# Patient Record
Sex: FEMALE | Race: WHITE | Employment: OTHER | URBAN - METROPOLITAN AREA
[De-identification: names, ages, dates, MRNs, and addresses within clinical notes are randomized per-mention and may not be internally consistent; named-entity substitution may affect disease eponyms.]

---

## 2017-01-23 RX ORDER — GABAPENTIN 100 MG/1
CAPSULE ORAL
Qty: 90 CAP | Refills: 0 | Status: SHIPPED | OUTPATIENT
Start: 2017-01-23 | End: 2017-04-12 | Stop reason: SDUPTHER

## 2017-02-24 ENCOUNTER — OFFICE VISIT (OUTPATIENT)
Dept: ORTHOPEDIC SURGERY | Age: 82
End: 2017-02-24

## 2017-02-24 VITALS
DIASTOLIC BLOOD PRESSURE: 68 MMHG | WEIGHT: 127 LBS | HEIGHT: 59 IN | SYSTOLIC BLOOD PRESSURE: 161 MMHG | HEART RATE: 82 BPM | BODY MASS INDEX: 25.6 KG/M2

## 2017-02-24 DIAGNOSIS — M48.061 SPINAL STENOSIS, LUMBAR: Primary | ICD-10-CM

## 2017-02-24 DIAGNOSIS — M47.817 SPONDYLOSIS WITHOUT MYELOPATHY OR RADICULOPATHY, LUMBOSACRAL REGION: ICD-10-CM

## 2017-02-24 DIAGNOSIS — M51.26 OTHER INTERVERTEBRAL DISC DISPLACEMENT, LUMBAR REGION: ICD-10-CM

## 2017-02-24 DIAGNOSIS — M51.37 OTHER INTERVERTEBRAL DISC DEGENERATION, LUMBOSACRAL REGION: ICD-10-CM

## 2017-02-24 DIAGNOSIS — M48.061 SPINAL STENOSIS, LUMBAR: ICD-10-CM

## 2017-02-24 RX ORDER — DULOXETIN HYDROCHLORIDE 20 MG/1
20 CAPSULE, DELAYED RELEASE ORAL DAILY
Qty: 30 CAP | Refills: 1 | Status: SHIPPED | OUTPATIENT
Start: 2017-02-24 | End: 2017-02-24 | Stop reason: SDUPTHER

## 2017-02-24 RX ORDER — DULOXETIN HYDROCHLORIDE 20 MG/1
CAPSULE, DELAYED RELEASE ORAL
Qty: 90 CAP | Refills: 1 | Status: SHIPPED | OUTPATIENT
Start: 2017-02-24

## 2017-02-24 NOTE — PROGRESS NOTES
[facility]  [address]  [address], [address]   Phone: [phone]  Fax: [phone]        PROGRESS NOTE      HISTORY OF PRESENT ILLNESS:  The patient is a 80 y.o. female and was seen today for follow up of improved low back pain, symptoms consistent for stenosis. She also reports cramping in her right calf at night. Patient has additional complaints of bilateral knee pain (L>R) and has previously seen by Dr. Lawrence Hi who administered knee injections which she did not tolerate particularly well. She tolerates Neurontin 100 mg TID with benefit. Patient states her symptoms have improved with the medication although it does cause drowsiness. Pt has a hx of glaucoma and is not a candidate for Topamax. She reports she stopped taking the blood thinners after GI bleeding. She had a blood transfusion, and it was determined the bleeding was due to the Medrol Dosepak. She is being treated by Dr. Vanessa Mendieta and diverticulitis was seen on the scope. Pt has a pacemaker and is not a candidate for an MRI. Patient denies fever, weight loss, or skin changes. Patient denies change in bowel or bladder habits. CT scan from 2012 showed moderate neuroforaminal stenosis. Preliminary reading of lumbar spine plain films revealed scoliosis apex convex to the right, pan lumbar degenerative disc disease, no acute pathology identified. At her last clinical appointment, her low back pain was well-controlled with Neurontin. She was provided with refills of Neurontin 100 mg TID. I referred her to Dr. Lawrence Hi for further evaluation of her bilateral knee pain (L>R). The patient returns today with pain location and distribution remain unchanged. She rates pain 0/10, an improvement from his last visit (5/10). Note from Dr. Lawrence Hi dated 12/5/16 indicating patient was seen for bilateral knee pain. Of note, he felt the majority of her pain was in the hips and buttocks.  She reported intolerance to TRAMADOL and d/c'd its use. Patient continues Neurontin 100 mg TID but is requesting to d/c as taking the medication TID is inconvenient for her.  reviewed. Past Medical History:   Diagnosis Date    Hypertension         Social History     Social History    Marital status:      Spouse name: N/A    Number of children: N/A    Years of education: N/A     Occupational History    Not on file. Social History Main Topics    Smoking status: Current Every Day Smoker     Packs/day: 0.50    Smokeless tobacco: Never Used    Alcohol use No    Drug use: No    Sexual activity: Not on file     Other Topics Concern    Not on file     Social History Narrative       Current Outpatient Prescriptions   Medication Sig Dispense Refill    DULoxetine (CYMBALTA) 20 mg capsule Take 1 Cap by mouth daily. 30 Cap 1    gabapentin (NEURONTIN) 100 mg capsule TAKE 1 CAPSULE BY MOUTH THREE TIMES DAILY 90 Cap 0    terbinafine HCl (LAMISIL) 1 % topical cream   6    latanoprost (XALATAN) 0.005 % ophthalmic solution Administer 1 Drop to both eyes nightly.  pantoprazole (PROTONIX) 40 mg tablet Take 40 mg by mouth daily.  diltiazem hcl (CARDIZEM) 120 mg tablet Take 120 mg by mouth four (4) times daily.  PNV with Ca No.65-Iron Poly-FA 60 mg iron-1 mg cap Take  by mouth.  levothyroxine (SYNTHROID) 50 mcg tablet Take  by mouth Daily (before breakfast).  cholecalciferol (VITAMIN D3) 1,000 unit tablet Take  by mouth daily.  traMADol (ULTRAM) 50 mg tablet Take 1 Tab by mouth every six (6) hours as needed for Pain. Max Daily Amount: 200 mg. 50 Tab 2    losartan-hydrochlorothiazide (HYZAAR) 100-12.5 mg per tablet TK 1 T PO QD  1    gabapentin (NEURONTIN) 100 mg capsule Take 1 Cap by mouth three (3) times daily.  90 Cap 1       Allergies   Allergen Reactions    Codeine Itching    Penicillins Unknown (comments)          PHYSICAL EXAMINATION    Visit Vitals    /68    Pulse 82  Ht 4' 10.5\" (1.486 m)    Wt 127 lb (57.6 kg)    BMI 26.09 kg/m2       CONSTITUTIONAL: NAD, A&O x 3  SENSATION: Intact to light touch throughout  RANGE OF MOTION: The patient has full passive range of motion in all four extremities. MOTOR:  Straight Leg Raise: Negative, bilateral               Hip Flex Knee Ext Knee Flex Ankle DF GTE Ankle PF Tone   Right +4/5 +4/5 +4/5 +4/5 +4/5 +4/5 +4/5   Left +4/5 +4/5 +4/5 +4/5 +4/5 +4/5 +4/5       ASSESSMENT   Marlin Perla was seen today for follow-up. Diagnoses and all orders for this visit:    Spinal stenosis, lumbar  -     DULoxetine (CYMBALTA) 20 mg capsule; Take 1 Cap by mouth daily. Other intervertebral disc displacement, lumbar region  -     DULoxetine (CYMBALTA) 20 mg capsule; Take 1 Cap by mouth daily. Spondylosis without myelopathy or radiculopathy, lumbosacral region  -     DULoxetine (CYMBALTA) 20 mg capsule; Take 1 Cap by mouth daily. Other intervertebral disc degeneration, lumbosacral region  -     DULoxetine (CYMBALTA) 20 mg capsule; Take 1 Cap by mouth daily. IMPRESSION AND PLAN:  She would like to try another neuropathic medication as she finds taking Neurontin TID inconvenient. Patient will d/c Neurontin 100 mg TID. I will try her on Cymbalta 20 mg per day. The risks, benefits, and potential side effects of this medication were discussed. Patient understands and wished to proceed. Patient advised to call the office if intolerant to new medication. I will see the patient back in 1 month's time or earlier if needed. Written by Keegan Velásquez, as dictated by Chepe Riley MD  I examined the patient, reviewed and agree with the note.

## 2017-02-24 NOTE — MR AVS SNAPSHOT
Visit Information Date & Time Provider Department Dept. Phone Encounter #  
 2/24/2017  2:20 PM Ewa Arredondo MD 4 [facility], [address] and [facility] [place] [phone] [id number] Follow-up Instructions Return in about 4 weeks (around 3/24/2017). Upcoming Health Maintenance Date Due DTaP/Tdap/Td series (1 - Tdap) 10/7/1950 ZOSTER VACCINE AGE 60> 10/7/1989 GLAUCOMA SCREENING Q2Y 10/7/1994 OSTEOPOROSIS SCREENING (DEXA) 10/7/1994 Pneumococcal 65+ Low/Medium Risk (1 of 2 - PCV13) 10/7/1994 MEDICARE YEARLY EXAM 10/7/1994 INFLUENZA AGE 9 TO ADULT 8/1/2016 Allergies as of 2/24/2017  Review Complete On: 2/24/2017 By: Ewa Arredondo MD  
  
 Severity Noted Reaction Type Reactions Codeine  07/18/2016    Itching Penicillins  12/12/2012    Unknown (comments) Current Immunizations  Never Reviewed No immunizations on file. Not reviewed this visit You Were Diagnosed With   
  
 Codes Comments Spinal stenosis, lumbar    -  Primary ICD-10-CM: M48.06 
ICD-9-CM: 724.02 Other intervertebral disc displacement, lumbar region     ICD-10-CM: M51.26 
ICD-9-CM: 722.10 Spondylosis without myelopathy or radiculopathy, lumbosacral region     ICD-10-CM: M47.817 ICD-9-CM: 721.3 Other intervertebral disc degeneration, lumbosacral region     ICD-10-CM: M51.37 
ICD-9-CM: 722.52 Vitals BP  
  
  
  
  
  
 161/68 Vitals History BMI and BSA Data Body Mass Index Body Surface Area 26.09 kg/m 2 1.54 m 2 Preferred Pharmacy Pharmacy Name Phone [facility] [address] [place] [address] AT [facility] [phone] Your Updated Medication List  
  
   
This list is accurate as of: 2/24/17  3:42 PM.  Always use your most recent med list.  
  
  
  
  
 cholecalciferol 1,000 unit tablet Commonly known as:  VITAMIN D3 Take  by mouth daily. dilTIAZem 120 mg tablet Commonly known as:  CARDIZEM Take 120 mg by mouth four (4) times daily. DULoxetine 20 mg capsule Commonly known as:  CYMBALTA Take 1 Cap by mouth daily. * gabapentin 100 mg capsule Commonly known as:  NEURONTIN Take 1 Cap by mouth three (3) times daily. * gabapentin 100 mg capsule Commonly known as:  NEURONTIN  
TAKE 1 CAPSULE BY MOUTH THREE TIMES DAILY  
  
 latanoprost 0.005 % ophthalmic solution Commonly known as:  Mesha Sin Administer 1 Drop to both eyes nightly. levothyroxine 50 mcg tablet Commonly known as:  SYNTHROID Take  by mouth Daily (before breakfast). losartan-hydroCHLOROthiazide 100-12.5 mg per tablet Commonly known as:  HYZAAR TK 1 T PO QD  
  
 pantoprazole 40 mg tablet Commonly known as:  PROTONIX Take 40 mg by mouth daily. PNV with Ca No.65-Iron Poly-FA 60 mg iron-1 mg Cap Take  by mouth. terbinafine HCl 1 % topical cream  
Commonly known as:  LAMISIL  
  
 traMADol 50 mg tablet Commonly known as:  ULTRAM  
Take 1 Tab by mouth every six (6) hours as needed for Pain. Max Daily Amount: 200 mg.  
  
 * Notice: This list has 2 medication(s) that are the same as other medications prescribed for you. Read the directions carefully, and ask your doctor or other care provider to review them with you. Prescriptions Sent to Pharmacy Refills DULoxetine (CYMBALTA) 20 mg capsule 1 Sig: Take 1 Cap by mouth daily. Class: Normal  
 Pharmacy: [facility] [address] [place] [address] [address]  #: [phone] Route: Oral  
  
Follow-up Instructions Return in about 4 weeks (around 3/24/2017). Introducing [facility]! Chuy Miller introduces [employer] patient portal. Now you can access parts of your medical record, email your doctor's office, and request medication refills online.    
 
1. In your internet browser, go to [email or web address] 2. Click on the First Time User? Click Here link in the Sign In box. You will see the New Member Sign Up page. 3. Enter your [employer] Access Code exactly as it appears below. You will not need to use this code after youve completed the sign-up process. If you do not sign up before the expiration date, you must request a new code. · [employer] Access Code: [email or web address] Expires: 5/25/2017  2:11 PM 
 
4. Enter the last four digits of your Social Security Number (xxxx) and Date of Birth (mm/dd/yyyy) as indicated and click Submit. You will be taken to the next sign-up page. 5. Create a [employer] ID. This will be your [employer] login ID and cannot be changed, so think of one that is secure and easy to remember. 6. Create a [employer] password. You can change your password at any time. 7. Enter your Password Reset Question and Answer. This can be used at a later time if you forget your password. 8. Enter your e-mail address. You will receive e-mail notification when new information is available in [address] 9. Click Sign Up. You can now view and download portions of your medical record. 10. Click the Download Summary menu link to download a portable copy of your medical information. If you have questions, please visit the Frequently Asked Questions section of the [employer] website. Remember, [employer] is NOT to be used for urgent needs. For medical emergencies, dial 911. Now available from your iPhone and Android! Please provide this summary of care documentation to your next provider. Your primary care clinician is listed as Bouchra Moya. If you have any questions after today's visit, please call [phone].

## 2017-03-01 ENCOUNTER — TELEPHONE (OUTPATIENT)
Dept: ORTHOPEDIC SURGERY | Age: 82
End: 2017-03-01

## 2017-03-01 DIAGNOSIS — M48.061 SPINAL STENOSIS, LUMBAR: ICD-10-CM

## 2017-03-01 DIAGNOSIS — M51.37 OTHER INTERVERTEBRAL DISC DEGENERATION, LUMBOSACRAL REGION: Primary | ICD-10-CM

## 2017-03-01 NOTE — TELEPHONE ENCOUNTER
Called and left voice mail informing patient per the provider to stop taking the Duloxetine and see if the symptoms subsides and she can go back on the Neurontin but I will need to give her the instructions to ramp back up on it.

## 2017-03-01 NOTE — TELEPHONE ENCOUNTER
Patient seen 02/24/17 given new medication Duloxetine  20 mg now having severe leg since starting this med also and severe nausea and dry heaves Need to come off this medication. Can she go back the Neurontin?   Please call patient back at today at [phone]

## 2017-03-01 NOTE — TELEPHONE ENCOUNTER
Please advise. Patient is not a candidate for Topamax due to history of glaucoma, Patient discontinued Neurontin due to it being inconvenient. Please advise.

## 2017-03-02 RX ORDER — GABAPENTIN 100 MG/1
100 CAPSULE ORAL
Qty: 90 CAP | Refills: 0 | Status: SHIPPED | OUTPATIENT
Start: 2017-03-02 | End: 2017-05-24 | Stop reason: SDUPTHER

## 2017-03-02 NOTE — TELEPHONE ENCOUNTER
Called and informed patient per the provider she can start back on the Neurontin 100 mg TID but she would have to take it as prescribed. Patient states she has a few pills left and she will need a refill. I gave the patient instructions to ramp up on the medication by taking 1 pill at night for 3 nights, next take 1 pill twice a day for 3 days and thereafter take 1 pill three times a day. Patient verbalized understanding. Patient states she had seen Dr. Nadine Felder before at least 4 years ago and whatever he did at that time worked and she just wants to have that done again. She states she does not know if it was an injection or medication, but whatever it was she wants it. I checked in our old system and the patient received Medrol Dose, Celebrex and flexeril and was sent for physical therapy and there were no injections done. Called and informed patient of the above findings and she states she would like to make an earlier appointment then what she had. I informed patient that Dr. Nadine Felder is out of the office next week and she states she would like the first available. An appointment has been made for 3/22/17 @8:40 am [facility] [place] [facility]. I did explain to patient that we have this thing called and tickler that we could put her on if she would like to be notified of any earlier cancellations. Patient states she would like to be put on it. I have spoken to [facility] [place] and she will put the patient.

## 2017-04-12 ENCOUNTER — OFFICE VISIT (OUTPATIENT)
Dept: ORTHOPEDIC SURGERY | Age: 82
End: 2017-04-12

## 2017-04-12 VITALS
DIASTOLIC BLOOD PRESSURE: 53 MMHG | WEIGHT: 125.8 LBS | RESPIRATION RATE: 18 BRPM | SYSTOLIC BLOOD PRESSURE: 170 MMHG | BODY MASS INDEX: 25.36 KG/M2 | HEART RATE: 74 BPM | HEIGHT: 59 IN

## 2017-04-12 DIAGNOSIS — M48.061 SPINAL STENOSIS, LUMBAR: Primary | ICD-10-CM

## 2017-04-12 DIAGNOSIS — M51.26 OTHER INTERVERTEBRAL DISC DISPLACEMENT, LUMBAR REGION: ICD-10-CM

## 2017-04-12 DIAGNOSIS — M47.817 SPONDYLOSIS WITHOUT MYELOPATHY OR RADICULOPATHY, LUMBOSACRAL REGION: ICD-10-CM

## 2017-04-12 DIAGNOSIS — M51.37 OTHER INTERVERTEBRAL DISC DEGENERATION, LUMBOSACRAL REGION: ICD-10-CM

## 2017-04-12 RX ORDER — GABAPENTIN 100 MG/1
100 CAPSULE ORAL 3 TIMES DAILY
Qty: 270 CAP | Refills: 0 | Status: SHIPPED | OUTPATIENT
Start: 2017-04-12

## 2017-04-12 RX ORDER — LANOLIN ALCOHOL/MO/W.PET/CERES
CREAM (GRAM) TOPICAL
Refills: 1 | COMMUNITY
Start: 2017-02-13

## 2017-04-12 NOTE — PROGRESS NOTES
[facility]  [address]  [address], [address]   Phone: [phone]  Fax: [phone]        PROGRESS NOTE      HISTORY OF PRESENT ILLNESS:  The patient is a 80 y.o. female and was seen today for follow up of improved low back pain, symptoms consistent for stenosis. She also reports cramping in her right calf at night. Her chief complaint is of bilateral knee pain (L>R) and has previously seen by Dr. Chava Aly who administered knee injections which she did not tolerate particularly well. Note from Dr. Chava Aly dated 12/5/16 indicating patient was seen for bilateral knee pain. Of note, he felt the majority of her pain was in the hips and buttocks. Pt has a hx of glaucoma and is not a candidate for Topamax. She reported intolerance to TRAMADOL and d/c'd its use. She reports she stopped taking the blood thinners after GI bleeding. Pt had a blood transfusion, and it was determined the bleeding was due to the Medrol Dosepak. She is being treated by Dr. Ciarra Segura and diverticulitis was seen on the scope. Pt has a pacemaker and is not a candidate for an MRI. Patient denies fever, weight loss, or skin changes. Patient denies change in bowel or bladder habits. CT scan from 2012 showed moderate neuroforaminal stenosis. Preliminary reading of lumbar spine plain films revealed scoliosis apex convex to the right, pan lumbar degenerative disc disease, no acute pathology identified. At her last clinical appointment, She wished to try another neuropathic medication as she found taking Neurontin TID inconvenient. Patient d/c Neurontin 100 mg TID. I tried her on Cymbalta 20 mg per day. The patient returns today with pain location and distribution remain unchanged. She states her low back pain is tolerable and her chief complaint is of bilateral knee pain. Pt rates pain 5/10, elevated since her last visit (0/10). She notes she has good days and bad days.  She reports intolerance to CYMBALTA 20 mg secondary to nausea and dry heaving. Pt subsequently restarted Neurontin 100 mg TID with benefit even though it is inconvenient for her.  reviewed. Past Medical History:   Diagnosis Date    Hypertension         Social History     Social History    Marital status:      Spouse name: N/A    Number of children: N/A    Years of education: N/A     Occupational History    Not on file. Social History Main Topics    Smoking status: Current Every Day Smoker     Packs/day: 0.50    Smokeless tobacco: Never Used    Alcohol use No    Drug use: No    Sexual activity: Not on file     Other Topics Concern    Not on file     Social History Narrative       Current Outpatient Prescriptions   Medication Sig Dispense Refill    ferrous sulfate 325 mg (65 mg iron) tablet TK 1 T PO TID  1    gabapentin (NEURONTIN) 100 mg capsule Take 1 Cap by mouth three (3) times daily. 270 Cap 0    DULoxetine (CYMBALTA) 20 mg capsule TAKE 1 CAPSULE BY MOUTH DAILY 90 Cap 1    terbinafine HCl (LAMISIL) 1 % topical cream   6    latanoprost (XALATAN) 0.005 % ophthalmic solution Administer 1 Drop to both eyes nightly.  pantoprazole (PROTONIX) 40 mg tablet Take 40 mg by mouth daily.  diltiazem hcl (CARDIZEM) 120 mg tablet Take 120 mg by mouth four (4) times daily.  PNV with Ca No.65-Iron Poly-FA 60 mg iron-1 mg cap Take  by mouth.  levothyroxine (SYNTHROID) 50 mcg tablet Take  by mouth Daily (before breakfast).  cholecalciferol (VITAMIN D3) 1,000 unit tablet Take  by mouth daily.  gabapentin (NEURONTIN) 100 mg capsule Take 1 Cap by mouth three (3) times daily (with meals). Indications: NEUROPATHIC PAIN 90 Cap 0    traMADol (ULTRAM) 50 mg tablet Take 1 Tab by mouth every six (6) hours as needed for Pain.  Max Daily Amount: 200 mg. 50 Tab 2    losartan-hydrochlorothiazide (HYZAAR) 100-12.5 mg per tablet TK 1 T PO QD  1       Allergies   Allergen Reactions    Codeine Itching    Penicillins Unknown (comments)          PHYSICAL EXAMINATION    Visit Vitals    /53 (BP 1 Location: Left arm, BP Patient Position: Sitting)    Pulse 74    Resp 18    Ht 4' 10.5\" (1.486 m)    Wt 125 lb 12.8 oz (57.1 kg)    BMI 25.84 kg/m2       CONSTITUTIONAL: NAD, A&O x 3  SENSATION: Intact to light touch throughout  RANGE OF MOTION: The patient has full passive range of motion in all four extremities. MOTOR:  Straight Leg Raise: Negative, bilateral               Hip Flex Knee Ext Knee Flex Ankle DF GTE Ankle PF Tone   Right +4/5 +4/5 +4/5 +4/5 +4/5 +4/5 +4/5   Left +4/5 +4/5 +4/5 +4/5 +4/5 +4/5 +4/5       ASSESSMENT   Danni Silva was seen today for back pain, leg pain and knee pain. Diagnoses and all orders for this visit:    Spinal stenosis, lumbar    Other intervertebral disc displacement, lumbar region    Spondylosis without myelopathy or radiculopathy, lumbosacral region    Other intervertebral disc degeneration, lumbosacral region    Other orders  -     gabapentin (NEURONTIN) 100 mg capsule; Take 1 Cap by mouth three (3) times daily. IMPRESSION AND PLAN:  Consideration was given to increased her Neurontin dose or switching her to Lyrica or Gabitril. She declines at this time and wishes to continue her current regimen. She was given refills of her Neurontin 100 mg TID. I will see the patient back in 3 month's time or earlier if needed. Written by Gail Mcclain, as dictated by Kandy Ramirez MD  I examined the patient, reviewed and agree with the note.

## 2017-04-12 NOTE — MR AVS SNAPSHOT
Visit Information Date & Time Provider Department Dept. Phone Encounter #  
 4/12/2017  9:40 AM Marissa Myers MD 4 [facility], [address] and [facility] [place] [phone] [id number] Follow-up Instructions Return in about 3 months (around 7/12/2017). Upcoming Health Maintenance Date Due DTaP/Tdap/Td series (1 - Tdap) 10/7/1950 ZOSTER VACCINE AGE 60> 10/7/1989 GLAUCOMA SCREENING Q2Y 10/7/1994 OSTEOPOROSIS SCREENING (DEXA) 10/7/1994 Pneumococcal 65+ Low/Medium Risk (1 of 2 - PCV13) 10/7/1994 MEDICARE YEARLY EXAM 10/7/1994 INFLUENZA AGE 9 TO ADULT 8/1/2016 Allergies as of 4/12/2017  Review Complete On: 4/12/2017 By: Stevenson Mckeon LPN Severity Noted Reaction Type Reactions Codeine  07/18/2016    Itching Penicillins  12/12/2012    Unknown (comments) Current Immunizations  Never Reviewed No immunizations on file. Not reviewed this visit You Were Diagnosed With   
  
 Codes Comments Spinal stenosis, lumbar    -  Primary ICD-10-CM: M48.06 
ICD-9-CM: 724.02 Other intervertebral disc displacement, lumbar region     ICD-10-CM: M51.26 
ICD-9-CM: 722.10 Spondylosis without myelopathy or radiculopathy, lumbosacral region     ICD-10-CM: M47.817 ICD-9-CM: 721.3 Other intervertebral disc degeneration, lumbosacral region     ICD-10-CM: M51.37 
ICD-9-CM: 722.52 Vitals BP Pulse Resp Height(growth percentile) Weight(growth percentile) BMI  
 170/53 (BP 1 Location: Left arm, BP Patient Position: Sitting) 74 18 4' 10.5\" (1.486 m) 125 lb 12.8 oz (57.1 kg) 25.84 kg/m2 Smoking Status Current Every Day Smoker BMI and BSA Data Body Mass Index Body Surface Area  
 25.84 kg/m 2 1.54 m 2 Preferred Pharmacy Pharmacy Name Phone [facility] [address] [place] [address] AT [facility] [phone] Your Updated Medication List  
  
   
This list is accurate as of: 4/12/17 10:10 AM.  Always use your most recent med list.  
  
  
  
  
 cholecalciferol 1,000 unit tablet Commonly known as:  VITAMIN D3 Take  by mouth daily. dilTIAZem 120 mg tablet Commonly known as:  CARDIZEM Take 120 mg by mouth four (4) times daily. DULoxetine 20 mg capsule Commonly known as:  CYMBALTA TAKE 1 CAPSULE BY MOUTH DAILY ferrous sulfate 325 mg (65 mg iron) tablet TK 1 T PO TID * gabapentin 100 mg capsule Commonly known as:  NEURONTIN Take 1 Cap by mouth three (3) times daily (with meals). Indications: NEUROPATHIC PAIN  
  
 * gabapentin 100 mg capsule Commonly known as:  NEURONTIN Take 1 Cap by mouth three (3) times daily. latanoprost 0.005 % ophthalmic solution Commonly known as:  Lexus Pentec[employer]al Administer 1 Drop to both eyes nightly. levothyroxine 50 mcg tablet Commonly known as:  SYNTHROID Take  by mouth Daily (before breakfast). losartan-hydroCHLOROthiazide 100-12.5 mg per tablet Commonly known as:  HYZAAR TK 1 T PO QD  
  
 pantoprazole 40 mg tablet Commonly known as:  PROTONIX Take 40 mg by mouth daily. PNV with Ca No.65-Iron Poly-FA 60 mg iron-1 mg Cap Take  by mouth. terbinafine HCl 1 % topical cream  
Commonly known as:  LAMISIL  
  
 traMADol 50 mg tablet Commonly known as:  ULTRAM  
Take 1 Tab by mouth every six (6) hours as needed for Pain. Max Daily Amount: 200 mg.  
  
 * Notice: This list has 2 medication(s) that are the same as other medications prescribed for you. Read the directions carefully, and ask your doctor or other care provider to review them with you. Prescriptions Sent to Pharmacy Refills  
 gabapentin (NEURONTIN) 100 mg capsule 0 Sig: Take 1 Cap by mouth three (3) times daily.   
 Class: Normal  
 Pharmacy: [facility] [address] [facility], [address] [place] [address] Angi  #: [phone] Route: Oral  
  
Follow-up Instructions Return in about 3 months (around 7/12/2017). Introducing [facility] & HEALTH SERVICES! [employer] introduces [employer] patient portal. Now you can access parts of your medical record, email your doctor's office, and request medication refills online. 1. In your internet browser, go to [email or web address] 2. Click on the First Time User? Click Here link in the Sign In box. You will see the New Member Sign Up page. 3. Enter your [employer] Access Code exactly as it appears below. You will not need to use this code after youve completed the sign-up process. If you do not sign up before the expiration date, you must request a new code. · [employer] Access Code: [email or web address] Expires: 5/25/2017  3:11 PM 
 
4. Enter the last four digits of your Social Security Number (xxxx) and Date of Birth (mm/dd/yyyy) as indicated and click Submit. You will be taken to the next sign-up page. 5. Create a [employer] ID. This will be your [employer] login ID and cannot be changed, so think of one that is secure and easy to remember. 6. Create a [employer] password. You can change your password at any time. 7. Enter your Password Reset Question and Answer. This can be used at a later time if you forget your password. 8. Enter your e-mail address. You will receive e-mail notification when new information is available in [address]. 9. Click Sign Up. You can now view and download portions of your medical record. 10. Click the Download Summary menu link to download a portable copy of your medical information. If you have questions, please visit the Frequently Asked Questions section of the [employer] website. Remember, [employer] is NOT to be used for urgent needs. For medical emergencies, dial 911. Now available from your iPhone and Android! Please provide this summary of care documentation to your next provider. Your primary care clinician is listed as Denny Damon. If you have any questions after today's visit, please call [phone].

## 2017-05-24 ENCOUNTER — OFFICE VISIT (OUTPATIENT)
Dept: ORTHOPEDIC SURGERY | Age: 82
End: 2017-05-24

## 2017-05-24 ENCOUNTER — DOCUMENTATION ONLY (OUTPATIENT)
Dept: ORTHOPEDIC SURGERY | Age: 82
End: 2017-05-24

## 2017-05-24 VITALS
DIASTOLIC BLOOD PRESSURE: 68 MMHG | SYSTOLIC BLOOD PRESSURE: 177 MMHG | WEIGHT: 125 LBS | BODY MASS INDEX: 25.2 KG/M2 | HEART RATE: 77 BPM | HEIGHT: 59 IN

## 2017-05-24 DIAGNOSIS — M48.061 SPINAL STENOSIS, LUMBAR: Primary | ICD-10-CM

## 2017-05-24 DIAGNOSIS — M51.26 OTHER INTERVERTEBRAL DISC DISPLACEMENT, LUMBAR REGION: ICD-10-CM

## 2017-05-24 DIAGNOSIS — M51.37 OTHER INTERVERTEBRAL DISC DEGENERATION, LUMBOSACRAL REGION: ICD-10-CM

## 2017-05-24 DIAGNOSIS — M47.817 SPONDYLOSIS WITHOUT MYELOPATHY OR RADICULOPATHY, LUMBOSACRAL REGION: ICD-10-CM

## 2017-05-24 RX ORDER — GABAPENTIN 300 MG/1
300 CAPSULE ORAL 3 TIMES DAILY
Qty: 90 CAP | Refills: 1 | Status: SHIPPED | OUTPATIENT
Start: 2017-05-24 | End: 2017-05-24 | Stop reason: SDUPTHER

## 2017-05-24 RX ORDER — GABAPENTIN 300 MG/1
CAPSULE ORAL
Qty: 270 CAP | Refills: 1 | Status: SHIPPED | OUTPATIENT
Start: 2017-05-24

## 2017-05-24 RX ORDER — KETOROLAC TROMETHAMINE 4 MG/ML
1 SOLUTION/ DROPS OPHTHALMIC 4 TIMES DAILY
COMMUNITY

## 2017-05-24 NOTE — PROGRESS NOTES
[facility]  [address], [place]   Phone: [phone]  Fax: [phone]        PROGRESS NOTE      HISTORY OF PRESENT ILLNESS:  The patient is a 80 y.o. female and was seen today for follow up of improved low back pain, symptoms consistent for stenosis. She also reports cramping in her right calf at night. Her chief complaint is of bilateral knee pain (L>R) and has previously seen by Dr. Dolly Canales who administered knee injections which she did not tolerate particularly well. Note from Dr. Dolly Canales dated 12/5/16 indicating patient was seen for bilateral knee pain. Of note, he felt the majority of her pain was in the hips and buttocks. Pt has a hx of glaucoma and is not a candidate for Topamax. She reported intolerance to TRAMADOL and d/c'd its use. She reports intolerance to CYMBALTA 20 mg secondary to nausea and dry heaving. Pt subsequently restarted Neurontin 100 mg TID with benefit even though it is inconvenient for her. She reports she stopped taking the blood thinners after GI bleeding. Pt had a blood transfusion, and it was determined the bleeding was due to the Medrol Dosepak. She is being treated by Dr. Jan Bernard and diverticulitis was seen on the scope. Pt has a pacemaker and is not a candidate for an MRI. Pt denies fever, weight loss, or skin changes. Pt denies change in bowel or bladder habits. CT scan from 2012 showed moderate neuroforaminal stenosis. Preliminary reading of lumbar spine plain films revealed scoliosis apex convex to the right, pan lumbar degenerative disc disease, no acute pathology identified. At her last clinical appointment, consideration was given to increase her Neurontin dose or switching her to Lyrica or Gabitril. She declined at that time and wishes to continue her current treatment regimen. She was given refills of her Neurontin 100 mg TID.       The patient returns today with progressive low back radiating into the LLE extending in an S1 distribution to the knee. She rates pain 7/10, elevated since her last visit (5/10). Her symptoms are exacerbated with standing and aleviated with sitting. Symptoms consistent for stenosis. Pt continues on Neurontin 100 mg TID which she tolerates well without relief.  reviewed. Past Medical History:   Diagnosis Date    Hypertension         Social History     Social History    Marital status:      Spouse name: N/A    Number of children: N/A    Years of education: N/A     Occupational History    Not on file. Social History Main Topics    Smoking status: Current Every Day Smoker     Packs/day: 0.50    Smokeless tobacco: Never Used    Alcohol use No    Drug use: No    Sexual activity: Not on file     Other Topics Concern    Not on file     Social History Narrative       Current Outpatient Prescriptions   Medication Sig Dispense Refill    ketorolac (ACULAR LS) 0.4 % drop Administer 1 Drop to both eyes four (4) times daily.  gabapentin (NEURONTIN) 300 mg capsule Take 1 Cap by mouth three (3) times daily. 90 Cap 1    gabapentin (NEURONTIN) 100 mg capsule Take 1 Cap by mouth three (3) times daily. 270 Cap 0    latanoprost (XALATAN) 0.005 % ophthalmic solution Administer 1 Drop to both eyes nightly.  pantoprazole (PROTONIX) 40 mg tablet Take 40 mg by mouth daily.  diltiazem hcl (CARDIZEM) 120 mg tablet Take 120 mg by mouth four (4) times daily.  PNV with Ca No.65-Iron Poly-FA 60 mg iron-1 mg cap Take  by mouth.  levothyroxine (SYNTHROID) 50 mcg tablet Take  by mouth Daily (before breakfast).  cholecalciferol (VITAMIN D3) 1,000 unit tablet Take  by mouth daily.  ferrous sulfate 325 mg (65 mg iron) tablet TK 1 T PO TID  1    DULoxetine (CYMBALTA) 20 mg capsule TAKE 1 CAPSULE BY MOUTH DAILY 90 Cap 1    traMADol (ULTRAM) 50 mg tablet Take 1 Tab by mouth every six (6) hours as needed for Pain.  Max Daily Amount: 200 mg. 50 Tab 2    terbinafine HCl (LAMISIL) 1 % topical cream   6    losartan-hydrochlorothiazide (HYZAAR) 100-12.5 mg per tablet TK 1 T PO QD  1       Allergies   Allergen Reactions    Codeine Itching    Penicillins Unknown (comments)          PHYSICAL EXAMINATION    Visit Vitals    /68    Pulse 77    Ht 4' 10.5\" (1.486 m)    Wt 125 lb (56.7 kg)    BMI 25.68 kg/m2       CONSTITUTIONAL: NAD, A&O x 3  SENSATION: Intact to light touch throughout  RANGE OF MOTION: The patient has full passive range of motion in all four extremities. MOTOR:  Straight Leg Raise: Negative, bilateral               Hip Flex Knee Ext Knee Flex Ankle DF GTE Ankle PF Tone   Right +4/5 +4/5 +4/5 +4/5 +4/5 +4/5 +4/5   Left +4/5 +4/5 +4/5 +4/5 +4/5 +4/5 +4/5       ASSESSMENT   Raman Monk was seen today for follow-up. Diagnoses and all orders for this visit:    Spinal stenosis, lumbar  -     CT SPINE LUMB W CONT; Future    Other intervertebral disc displacement, lumbar region  -     CT SPINE LUMB W CONT; Future    Spondylosis without myelopathy or radiculopathy, lumbosacral region  -     CT SPINE LUMB W CONT; Future    Other intervertebral disc degeneration, lumbosacral region  -     CT SPINE LUMB W CONT; Future    Other orders  -     gabapentin (NEURONTIN) 300 mg capsule; Take 1 Cap by mouth three (3) times daily. IMPRESSION AND PLAN:  She describes symptoms consistent for spinal stenosis. Patient wishes to proceed with lumbar blocks. I will set her up for a lumbar spine CT. In the interim, I will increase her Neurontin to 300 mg TID. Patient advised to call the office if intolerant to higher dose. I will see the patient back following CT. Written by Rock Herrera, as dictated by Junior Gordon MD  I examined the patient, reviewed and agree with the note.

## 2017-05-24 NOTE — PROGRESS NOTES
CT  Lumbar with is scheduled at [facility], [id number], on 05/31/17, arrive 1:30PM, test 2:00PM. No Medicare pre-authorization required.

## 2017-05-24 NOTE — MR AVS SNAPSHOT
Visit Information Date & Time Provider Department Dept. Phone Encounter #  
 5/24/2017 10:15 AM Daxa Guido  [address], [address] and [facility] [place]  Follow-up Instructions Return for following CT. Your Appointments 7/7/2017  1:00 PM  
Follow Up with Daxa Guido MD  
[address], [address] and [facility] [place] ([facility]) Appt Note: 3 month ck up lower back; rs fr 7-12 due to dr out of the office; PT RETURNED [address] [facility] 7/12/17 FOR THIS NEW DATE.  
 [address] [facility] [address]  
[address] [facility] Upcoming Health Maintenance Date Due DTaP/Tdap/Td series (1 - Tdap) 10/7/1950 ZOSTER VACCINE AGE 60> 10/7/1989 GLAUCOMA SCREENING Q2Y 10/7/1994 OSTEOPOROSIS SCREENING (DEXA) 10/7/1994 Pneumococcal 65+ Low/Medium Risk (1 of 2 - PCV13) 10/7/1994 MEDICARE YEARLY EXAM 10/7/1994 INFLUENZA AGE 9 TO ADULT 8/1/2017 Allergies as of 5/24/2017  Review Complete On: 5/24/2017 By: Daxa Guido MD  
  
 Severity Noted Reaction Type Reactions Codeine  07/18/2016    Itching Penicillins  12/12/2012    Unknown (comments) Current Immunizations  Never Reviewed No immunizations on file. Not reviewed this visit You Were Diagnosed With   
  
 Codes Comments Spinal stenosis, lumbar    -  Primary ICD-10-CM: M48.06 
ICD-9-CM: 724.02 Other intervertebral disc displacement, lumbar region     ICD-10-CM: M51.26 
ICD-9-CM: 722.10 Spondylosis without myelopathy or radiculopathy, lumbosacral region     ICD-10-CM: M47.817 ICD-9-CM: 721.3 Other intervertebral disc degeneration, lumbosacral region     ICD-10-CM: M51.37 
ICD-9-CM: 722.52 Vitals BP Pulse Height(growth percentile) Weight(growth percentile) BMI Smoking Status  177/68 77 4' 10.5\" (1.486 m) 125 lb (56.7 kg) 25.68 kg/m2 Current Every Day Smoker Vitals History BMI and BSA Data Body Mass Index Body Surface Area  
 25.68 kg/m 2 1.53 m 2 Preferred Pharmacy Pharmacy Name Phone [facility] [address] [facility], [place] [address] [facility] AT [facility] [phone] Your Updated Medication List  
  
   
This list is accurate as of: 5/24/17 12:14 PM.  Always use your most recent med list.  
  
  
  
  
 ACULAR LS 0.4 % Drop Generic drug:  ketorolac Administer 1 Drop to both eyes four (4) times daily. cholecalciferol 1,000 unit tablet Commonly known as:  VITAMIN D3 Take  by mouth daily. dilTIAZem 120 mg tablet Commonly known as:  CARDIZEM Take 120 mg by mouth four (4) times daily. DULoxetine 20 mg capsule Commonly known as:  CYMBALTA TAKE 1 CAPSULE BY MOUTH DAILY ferrous sulfate 325 mg (65 mg iron) tablet TK 1 T PO TID * gabapentin 100 mg capsule Commonly known as:  NEURONTIN Take 1 Cap by mouth three (3) times daily. * gabapentin 300 mg capsule Commonly known as:  NEURONTIN Take 1 Cap by mouth three (3) times daily. latanoprost 0.005 % ophthalmic solution Commonly known as:  Renee Anuj Administer 1 Drop to both eyes nightly. levothyroxine 50 mcg tablet Commonly known as:  SYNTHROID Take  by mouth Daily (before breakfast). losartan-hydroCHLOROthiazide 100-12.5 mg per tablet Commonly known as:  HYZAAR TK 1 T PO QD  
  
 pantoprazole 40 mg tablet Commonly known as:  PROTONIX Take 40 mg by mouth daily. PNV with Ca No.65-Iron Poly-FA 60 mg iron-1 mg Cap Take  by mouth. terbinafine HCl 1 % topical cream  
Commonly known as:  LAMISIL  
  
 traMADol 50 mg tablet Commonly known as:  ULTRAM  
Take 1 Tab by mouth every six (6) hours as needed for Pain. Max Daily Amount: 200 mg.  
  
 * Notice:   This list has 2 medication(s) that are the same as other medications prescribed for you. Read the directions carefully, and ask your doctor or other care provider to review them with you. Prescriptions Sent to Pharmacy Refills  
 gabapentin (NEURONTIN) 300 mg capsule 1 Sig: Take 1 Cap by mouth three (3) times daily. Class: Normal  
 Pharmacy: [facility] [address] [place] [address] [address] Ph #: [phone] Route: Oral  
  
Follow-up Instructions Return for following CT. To-Do List   
 05/31/2017 Imaging:  CT SPINE LUMB W CONT Introducing [facility] & HEALTH SERVICES! [employer] introduces [employer] patient portal. Now you can access parts of your medical record, email your doctor's office, and request medication refills online. 1. In your internet browser, go to [email or web address] 2. Click on the First Time User? Click Here link in the Sign In box. You will see the New Member Sign Up page. 3. Enter your [employer] Access Code exactly as it appears below. You will not need to use this code after youve completed the sign-up process. If you do not sign up before the expiration date, you must request a new code. · [employer] Access Code: [email or web address] Expires: 5/25/2017  3:11 PM 
 
4. Enter the last four digits of your Social Security Number (xxxx) and Date of Birth (mm/dd/yyyy) as indicated and click Submit. You will be taken to the next sign-up page. 5. Create a [employer] ID. This will be your [employer] login ID and cannot be changed, so think of one that is secure and easy to remember. 6. Create a [employer] password. You can change your password at any time. 7. Enter your Password Reset Question and Answer. This can be used at a later time if you forget your password. 8. Enter your e-mail address. You will receive e-mail notification when new information is available in [address] 9. Click Sign Up. You can now view and download portions of your medical record. 10. Click the Download Summary menu link to download a portable copy of your medical information. If you have questions, please visit the Frequently Asked Questions section of the [employer] website. Remember, [employer] is NOT to be used for urgent needs. For medical emergencies, dial 911. Now available from your iPhone and Android! Please provide this summary of care documentation to your next provider. Your primary care clinician is listed as Devi Hunt. If you have any questions after today's visit, please call [phone].

## 2017-06-21 ENCOUNTER — OFFICE VISIT (OUTPATIENT)
Dept: ORTHOPEDIC SURGERY | Age: 82
End: 2017-06-21

## 2017-06-21 VITALS
WEIGHT: 124 LBS | BODY MASS INDEX: 25 KG/M2 | DIASTOLIC BLOOD PRESSURE: 72 MMHG | HEART RATE: 75 BPM | SYSTOLIC BLOOD PRESSURE: 145 MMHG | HEIGHT: 59 IN | RESPIRATION RATE: 18 BRPM

## 2017-06-21 DIAGNOSIS — M48.061 SPINAL STENOSIS, LUMBAR: Primary | ICD-10-CM

## 2017-06-21 DIAGNOSIS — M51.26 OTHER INTERVERTEBRAL DISC DISPLACEMENT, LUMBAR REGION: ICD-10-CM

## 2017-06-21 DIAGNOSIS — M47.817 SPONDYLOSIS WITHOUT MYELOPATHY OR RADICULOPATHY, LUMBOSACRAL REGION: ICD-10-CM

## 2017-06-21 DIAGNOSIS — M51.37 OTHER INTERVERTEBRAL DISC DEGENERATION, LUMBOSACRAL REGION: ICD-10-CM

## 2017-06-21 RX ORDER — VALSARTAN 160 MG/1
TABLET ORAL
COMMUNITY
Start: 2017-06-20

## 2017-06-21 NOTE — PROGRESS NOTES
[facility]  [address], [place]   Phone: [phone]  Fax: [phone]        PROGRESS NOTE      HISTORY OF PRESENT ILLNESS:  The patient is a 80 y.o. female and was seen today for follow up of progressive low back radiating into the BLE (L>R) extending in an S1 distribution to the knee. Her symptoms are exacerbated with standing and alleviated with sitting. Symptoms consistent for stenosis. Pt also reports cramping in her right calf at night. Her chief complaint is of bilateral knee pain (L>R) and has previously seen by Dr. Rell Carlton who administered knee injections which she did not tolerate particularly well. Note from Dr. Rell Carlton dated 12/5/16 indicating patient was seen for bilateral knee pain. Of note, he felt the majority of her pain was in the hips and buttocks. Pt has a hx of glaucoma and is not a candidate for Topamax. She reported intolerance to TRAMADOL and d/c'd its use. She reports intolerance to CYMBALTA 20 mg secondary to nausea and dry heaving. Pt subsequently restarted Neurontin 100 mg TID with benefit even though it is inconvenient for her. She reports she stopped taking the blood thinners after GI bleeding. Pt had a blood transfusion, and it was determined the bleeding was due to the Medrol Dosepak. She is being treated by Dr. Katya Kulkarni and diverticulitis was seen on the scope. Pt has a pacemaker and is not a candidate for an MRI. Pt denies fever, weight loss, or skin changes. Pt denies change in bowel or bladder habits. CT scan from 2012 showed moderate neuroforaminal stenosis. Preliminary reading of lumbar spine plain films revealed scoliosis apex convex to the right, pan lumbar degenerative disc disease, no acute pathology identified. At her last clinical appointment, she described symptoms consistent for spinal stenosis. Patient wished to proceed with lumbar blocks. I set her up for a lumbar spine CT.  I increased her Neurontin to 300 mg TID. The patient returns today with pain location and distribution remain unchanged. She rates pain 0-7/10, an improvement since her last visit (7/10). Pt is tolerating increased Neurontin 300 mg TID without relief. Lumbar spine CT dated 6/1/17 reviewed. Per report, dextroscoliosis. Diffuse lower thoracic and lumbar spondylosis and near diffuse degenerative disc disease. L2-3 mild central stenosis. L3-4 severe central canal and mild bilateral foraminal stenosis. l4-5 severe right foraminal stenosis and right upper lateral recess stenosis. Small L5-S1 right foraminal disc protrusion. Severe right-sided L5-S1 foraminal stenosis and likely exiting nerve impingement. Sigmoid diverticulosis. Aortoiliac atherosclerosis.  reviewed. Past Medical History:   Diagnosis Date    Hypertension         Social History     Social History    Marital status:      Spouse name: N/A    Number of children: N/A    Years of education: N/A     Occupational History    Not on file. Social History Main Topics    Smoking status: Current Every Day Smoker     Packs/day: 0.50    Smokeless tobacco: Never Used    Alcohol use No    Drug use: No    Sexual activity: Not on file     Other Topics Concern    Not on file     Social History Narrative       Current Outpatient Prescriptions   Medication Sig Dispense Refill    valsartan (DIOVAN) 160 mg tablet       ketorolac (ACULAR LS) 0.4 % drop Administer 1 Drop to both eyes four (4) times daily.  gabapentin (NEURONTIN) 300 mg capsule TAKE 1 CAPSULE BY MOUTH THREE TIMES DAILY 270 Cap 1    ferrous sulfate 325 mg (65 mg iron) tablet TK 1 T PO TID  1    latanoprost (XALATAN) 0.005 % ophthalmic solution Administer 1 Drop to both eyes nightly.  pantoprazole (PROTONIX) 40 mg tablet Take 40 mg by mouth daily.  diltiazem hcl (CARDIZEM) 120 mg tablet Take 120 mg by mouth four (4) times daily.       PNV with Ca No.65-Iron Poly-FA 60 mg iron-1 mg cap Take  by mouth.  levothyroxine (SYNTHROID) 50 mcg tablet Take  by mouth Daily (before breakfast).  cholecalciferol (VITAMIN D3) 1,000 unit tablet Take  by mouth daily.  gabapentin (NEURONTIN) 100 mg capsule Take 1 Cap by mouth three (3) times daily. 270 Cap 0    DULoxetine (CYMBALTA) 20 mg capsule TAKE 1 CAPSULE BY MOUTH DAILY 90 Cap 1    traMADol (ULTRAM) 50 mg tablet Take 1 Tab by mouth every six (6) hours as needed for Pain. Max Daily Amount: 200 mg. 50 Tab 2    terbinafine HCl (LAMISIL) 1 % topical cream   6    losartan-hydrochlorothiazide (HYZAAR) 100-12.5 mg per tablet TK 1 T PO QD  1       Allergies   Allergen Reactions    Codeine Itching    Penicillins Unknown (comments)          PHYSICAL EXAMINATION    Visit Vitals    /72    Pulse 75    Resp 18    Ht 4' 10.5\" (1.486 m)    Wt 124 lb (56.2 kg)    BMI 25.47 kg/m2       CONSTITUTIONAL: NAD, A&O x 3  SENSATION: Intact to light touch throughout  RANGE OF MOTION: The patient has full passive range of motion in all four extremities. MOTOR:  Straight Leg Raise: Negative, bilateral               Hip Flex Knee Ext Knee Flex Ankle DF GTE Ankle PF Tone   Right +4/5 +4/5 +4/5 +4/5 +4/5 +4/5 +4/5   Left +4/5 +4/5 +4/5 +4/5 +4/5 +4/5 +4/5       ASSESSMENT   Teodoro Meehan was seen today for back pain and leg pain. Diagnoses and all orders for this visit:    Spinal stenosis, lumbar    Other intervertebral disc displacement, lumbar region    Spondylosis without myelopathy or radiculopathy, lumbosacral region    Other intervertebral disc degeneration, lumbosacral region    Other orders  -     SCHEDULE SURGERY          IMPRESSION AND PLAN:  The patient elects to proceed with lumbar blocks. I will order an L4/5 epidural.  She would like to continue on Neurontin 300 mg TID. I will see the patient back following block.        Written by Memo Barillas, as dictated by Dakota Aguiar MD  I examined the patient, reviewed and agree with the note.

## 2017-06-23 RX ORDER — DIAZEPAM 10 MG/1
TABLET ORAL
Qty: 1 TAB | Refills: 0 | OUTPATIENT
Start: 2017-06-23

## 2017-07-07 ENCOUNTER — OFFICE VISIT (OUTPATIENT)
Dept: ORTHOPEDIC SURGERY | Age: 82
End: 2017-07-07

## 2017-07-07 VITALS
HEART RATE: 85 BPM | RESPIRATION RATE: 20 BRPM | DIASTOLIC BLOOD PRESSURE: 59 MMHG | BODY MASS INDEX: 26.5 KG/M2 | SYSTOLIC BLOOD PRESSURE: 152 MMHG | WEIGHT: 129 LBS

## 2017-07-07 DIAGNOSIS — M25.561 PAIN IN BOTH KNEES, UNSPECIFIED CHRONICITY: ICD-10-CM

## 2017-07-07 DIAGNOSIS — M25.562 PAIN IN BOTH KNEES, UNSPECIFIED CHRONICITY: ICD-10-CM

## 2017-07-07 DIAGNOSIS — M47.817 SPONDYLOSIS WITHOUT MYELOPATHY OR RADICULOPATHY, LUMBOSACRAL REGION: ICD-10-CM

## 2017-07-07 DIAGNOSIS — M51.26 OTHER INTERVERTEBRAL DISC DISPLACEMENT, LUMBAR REGION: ICD-10-CM

## 2017-07-07 DIAGNOSIS — M51.37 OTHER INTERVERTEBRAL DISC DEGENERATION, LUMBOSACRAL REGION: ICD-10-CM

## 2017-07-07 DIAGNOSIS — M48.061 SPINAL STENOSIS, LUMBAR: Primary | ICD-10-CM

## 2017-07-07 NOTE — PROGRESS NOTES
[facility]  [address], [place]   Phone: [phone]  Fax: [phone]        PROGRESS NOTE      HISTORY OF PRESENT ILLNESS:  The patient is a 80 y.o. female and was seen today for follow up of progressive low back radiating into the BLE (L>R) extending in an S1 distribution to the knee. Her symptoms are exacerbated with standing and alleviated with sitting. Symptoms consistent for stenosis. Pt also reports cramping in her right calf at night. Her chief complaint is of bilateral knee pain (L>R) and has previously seen by Dr. Geovany Talavera who administered knee injections which she did not tolerate particularly well. Note from Dr. Geovany Talavera dated 12/5/16 indicating patient was seen for bilateral knee pain. Of note, he felt the majority of her pain was in the hips and buttocks. Pt has a hx of glaucoma and is not a candidate for Topamax. She reported intolerance to TRAMADOL and d/c'd its use. She reports intolerance to CYMBALTA 20 mg secondary to nausea and dry heaving. Pt subsequently restarted Neurontin 100 mg TID with benefit even though it is inconvenient for her. She reports she stopped taking the blood thinners after GI bleeding. Pt had a blood transfusion, and it was determined the bleeding was due to the Medrol Dosepak. She is being treated by Dr. Guanakito Singh and diverticulitis was seen on the scope. Pt has a pacemaker and is not a candidate for an MRI. Pt denies fever, weight loss, or skin changes. Pt denies change in bowel or bladder habits. CT scan from 2012 showed moderate neuroforaminal stenosis. Preliminary reading of lumbar spine plain films revealed scoliosis apex convex to the right, pan lumbar degenerative disc disease, no acute pathology identified. Lumbar spine CT dated 6/1/17 reviewed. Per report, dextroscoliosis. Diffuse lower thoracic and lumbar spondylosis and near diffuse degenerative disc disease. L2-3 mild central stenosis.  L3-4 severe central canal and mild bilateral foraminal stenosis. L4-5 severe right foraminal stenosis and right upper lateral recess stenosis. Small L5-S1 right foraminal disc protrusion. Severe right-sided L5-S1 foraminal stenosis and likely exiting nerve impingement. Sigmoid diverticulosis. Aortoiliac atherosclerosis. At her last clinical appointment, the patient elected to proceed with lumbar blocks. I ordered an L4/5 epidural.  She wished to continue on Neurontin 300 mg TID. The patient returns today with low back pain. Her BLE symptoms are localized to the knees. She rates pain 0-5/10, a decrease since her last visit (0-7/10). Pt underwent L4/5 epidural on 6/27/17 with good relief for her low back pain. She continues on Neurontin 300 mg TID but wishes to d/c as she \"does not like the way it makes me feel. \"    reviewed. Past Medical History:   Diagnosis Date    Hypertension         Social History     Social History    Marital status:      Spouse name: N/A    Number of children: N/A    Years of education: N/A     Occupational History    Not on file. Social History Main Topics    Smoking status: Current Every Day Smoker     Packs/day: 0.50    Smokeless tobacco: Never Used    Alcohol use No    Drug use: No    Sexual activity: Not on file     Other Topics Concern    Not on file     Social History Narrative       Current Outpatient Prescriptions   Medication Sig Dispense Refill    ketorolac (ACULAR LS) 0.4 % drop Administer 1 Drop to both eyes four (4) times daily.  gabapentin (NEURONTIN) 300 mg capsule TAKE 1 CAPSULE BY MOUTH THREE TIMES DAILY 270 Cap 1    terbinafine HCl (LAMISIL) 1 % topical cream   6    losartan-hydrochlorothiazide (HYZAAR) 100-12.5 mg per tablet TK 1 T PO QD  1    latanoprost (XALATAN) 0.005 % ophthalmic solution Administer 1 Drop to both eyes nightly.  pantoprazole (PROTONIX) 40 mg tablet Take 40 mg by mouth daily.       diltiazem hcl (CARDIZEM) 120 mg tablet Take 120 mg by mouth four (4) times daily.  PNV with Ca No.65-Iron Poly-FA 60 mg iron-1 mg cap Take  by mouth.  levothyroxine (SYNTHROID) 50 mcg tablet Take  by mouth Daily (before breakfast).  cholecalciferol (VITAMIN D3) 1,000 unit tablet Take  by mouth daily.  diazePAM (VALIUM) 10 mg tablet Take 1 tab by mouth as directed by nurse prior to procedure 1 Tab 0    valsartan (DIOVAN) 160 mg tablet       ferrous sulfate 325 mg (65 mg iron) tablet TK 1 T PO TID  1    gabapentin (NEURONTIN) 100 mg capsule Take 1 Cap by mouth three (3) times daily. 270 Cap 0    DULoxetine (CYMBALTA) 20 mg capsule TAKE 1 CAPSULE BY MOUTH DAILY 90 Cap 1    traMADol (ULTRAM) 50 mg tablet Take 1 Tab by mouth every six (6) hours as needed for Pain. Max Daily Amount: 200 mg. 50 Tab 2       Allergies   Allergen Reactions    Codeine Itching    Penicillins Unknown (comments)          PHYSICAL EXAMINATION    Visit Vitals    /59    Pulse 85    Resp 20    Wt 129 lb (58.5 kg)    BMI 26.5 kg/m2       CONSTITUTIONAL: NAD, A&O x 3  SENSATION: Intact to light touch throughout  RANGE OF MOTION: The patient has full passive range of motion in all four extremities. MOTOR:  Straight Leg Raise: Negative, bilateral               Hip Flex Knee Ext Knee Flex Ankle DF GTE Ankle PF Tone   Right +4/5 +4/5 +4/5 +4/5 +4/5 +4/5 +4/5   Left +4/5 +4/5 +4/5 +4/5 +4/5 +4/5 +4/5       ASSESSMENT   Khushbu Bauer was seen today for back pain.     Diagnoses and all orders for this visit:    Spinal stenosis, lumbar  -     REFERRAL TO ORTHOPEDICS    Other intervertebral disc displacement, lumbar region  -     REFERRAL TO ORTHOPEDICS    Spondylosis without myelopathy or radiculopathy, lumbosacral region  -     REFERRAL TO ORTHOPEDICS    Other intervertebral disc degeneration, lumbosacral region  -     REFERRAL TO ORTHOPEDICS    Pain in both knees, unspecified chronicity  -     REFERRAL TO ORTHOPEDICS          IMPRESSION AND PLAN:  She is s/p L4/5 epidural from 6/27/17 with good relief for her low back pain. Her primary complaint is of bilateral knee pain, which I suspect are likely not related to spinal pathology and potentially related to knee issues. She has seen Dr. Mouna Ewing for her bilateral knee pain who has recently retired. I will refer her to Dr. Daphne Vincent for further evaluation of her bilateral knee pain. Per patient request, I will wean her off Neurontin 300 mg TID. I will see the patient back on an as-needed basis. Written by Bladimir Leggett, as dictated by Mary Ann Gallegos MD  I examined the patient, reviewed and agree with the note.

## 2017-07-07 NOTE — MR AVS SNAPSHOT
Visit Information Date & Time Provider Department Dept. Phone Encounter #  
 7/7/2017  1:00 PM Bing Kerns MD [facility] [place] [phone] Follow-up Instructions Return if symptoms worsen or fail to improve. Upcoming Health Maintenance Date Due DTaP/Tdap/Td series (1 - Tdap) 10/7/1950 ZOSTER VACCINE AGE 60> 10/7/1989 GLAUCOMA SCREENING Q2Y 10/7/1994 OSTEOPOROSIS SCREENING (DEXA) 10/7/1994 Pneumococcal 65+ Low/Medium Risk (1 of 2 - PCV13) 10/7/1994 MEDICARE YEARLY EXAM 10/7/1994 INFLUENZA AGE 9 TO ADULT 8/1/2017 Allergies as of 7/7/2017  Review Complete On: 7/7/2017 By: Bing Kerns MD  
  
 Severity Noted Reaction Type Reactions Codeine  07/18/2016    Itching Penicillins  12/12/2012    Unknown (comments) Current Immunizations  Never Reviewed No immunizations on file. Not reviewed this visit You Were Diagnosed With   
  
 Codes Comments Spinal stenosis, lumbar    -  Primary ICD-10-CM: M48.06 
ICD-9-CM: 724.02 Other intervertebral disc displacement, lumbar region     ICD-10-CM: M51.26 
ICD-9-CM: 722.10 Spondylosis without myelopathy or radiculopathy, lumbosacral region     ICD-10-CM: M47.817 ICD-9-CM: 721.3 Other intervertebral disc degeneration, lumbosacral region     ICD-10-CM: M51.37 
ICD-9-CM: 722.52 Pain in both knees, unspecified chronicity     ICD-10-CM: M25.561, M25.562 ICD-9-CM: 719.46 Vitals BP Pulse Resp Weight(growth percentile) BMI Smoking Status 152/59 85 20 129 lb (58.5 kg) 26.5 kg/m2 Current Every Day Smoker BMI and BSA Data Body Mass Index Body Surface Area  
 26.5 kg/m 2 1.55 m 2 Preferred Pharmacy Pharmacy Name Phone [facility] [address] [facility] [phone] Your Updated Medication List  
  
   
 This list is accurate as of: 7/7/17  1:18 PM.  Always use your most recent med list.  
  
  
  
  
 ACULAR LS 0.4 % Drop Generic drug:  ketorolac Administer 1 Drop to both eyes four (4) times daily. cholecalciferol 1,000 unit tablet Commonly known as:  VITAMIN D3 Take  by mouth daily. diazePAM 10 mg tablet Commonly known as:  VALIUM Take 1 tab by mouth as directed by nurse prior to procedure  
  
 dilTIAZem 120 mg tablet Commonly known as:  CARDIZEM Take 120 mg by mouth four (4) times daily. DULoxetine 20 mg capsule Commonly known as:  CYMBALTA TAKE 1 CAPSULE BY MOUTH DAILY ferrous sulfate 325 mg (65 mg iron) tablet TK 1 T PO TID * gabapentin 100 mg capsule Commonly known as:  NEURONTIN Take 1 Cap by mouth three (3) times daily. * gabapentin 300 mg capsule Commonly known as:  NEURONTIN  
TAKE 1 CAPSULE BY MOUTH THREE TIMES DAILY  
  
 latanoprost 0.005 % ophthalmic solution Commonly known as:  Ivon Posadasger Administer 1 Drop to both eyes nightly. levothyroxine 50 mcg tablet Commonly known as:  SYNTHROID Take  by mouth Daily (before breakfast). losartan-hydroCHLOROthiazide 100-12.5 mg per tablet Commonly known as:  HYZAAR TK 1 T PO QD  
  
 pantoprazole 40 mg tablet Commonly known as:  PROTONIX Take 40 mg by mouth daily. PNV with Ca No.65-Iron Poly-FA 60 mg iron-1 mg Cap Take  by mouth. terbinafine HCl 1 % topical cream  
Commonly known as:  LAMISIL  
  
 traMADol 50 mg tablet Commonly known as:  ULTRAM  
Take 1 Tab by mouth every six (6) hours as needed for Pain. Max Daily Amount: 200 mg.  
  
 valsartan 160 mg tablet Commonly known as:  DIOVAN  
  
 * Notice: This list has 2 medication(s) that are the same as other medications prescribed for you. Read the directions carefully, and ask your doctor or other care provider to review them with you. Follow-up Instructions Return if symptoms worsen or fail to improve. Introducing [facility]! [facility] introduces aDealio patient portal. Now you can access parts of your medical record, email your doctor's office, and request medication refills online. 1. In your internet browser, go to [email or web address] 2. Click on the First Time User? Click Here link in the Sign In box. You will see the New Member Sign Up page. 3. Enter your aDealio Access Code exactly as it appears below. You will not need to use this code after youve completed the sign-up process. If you do not sign up before the expiration date, you must request a new code. · aDealio Access Code: [email or web address] Expires: 9/19/2017  3:25 PM 
 
4. Enter the last four digits of your Social Security Number (xxxx) and Date of Birth (mm/dd/yyyy) as indicated and click Submit. You will be taken to the next sign-up page. 5. Create a aDealio ID. This will be your aDealio login ID and cannot be changed, so think of one that is secure and easy to remember. 6. Create a aDealio password. You can change your password at any time. 7. Enter your Password Reset Question and Answer. This can be used at a later time if you forget your password. 8. Enter your e-mail address. You will receive e-mail notification when new information is available in [address] 9. Click Sign Up. You can now view and download portions of your medical record. 10. Click the Download Summary menu link to download a portable copy of your medical information. If you have questions, please visit the Frequently Asked Questions section of the aDealio website. Remember, aDealio is NOT to be used for urgent needs. For medical emergencies, dial 911. Now available from your iPhone and Android! Please provide this summary of care documentation to your next provider. Your primary care clinician is listed as Murray Steele.  If you have any questions after today's visit, please call [phone].

## 2017-07-10 ENCOUNTER — TELEPHONE (OUTPATIENT)
Dept: ORTHOPEDIC SURGERY | Age: 82
End: 2017-07-10

## 2017-07-10 NOTE — TELEPHONE ENCOUNTER
Pt called concerned with her medication list she received a copy during her visit on 7/7/17- states she no longer takes the ferrous suflate 325mg, Diazepam or Duloxetine. Also states the Neurontin will be completed as of 7/12/17.

## 2017-07-11 NOTE — TELEPHONE ENCOUNTER
Patient is to follow up PRN. She will speak to her PCP regarding the medications that are still in the system that she is no longer taking.

## 2017-09-07 ENCOUNTER — OFFICE VISIT (OUTPATIENT)
Dept: ORTHOPEDIC SURGERY | Age: 82
End: 2017-09-07

## 2017-09-07 VITALS
TEMPERATURE: 96.9 F | BODY MASS INDEX: 26 KG/M2 | HEART RATE: 74 BPM | WEIGHT: 129 LBS | SYSTOLIC BLOOD PRESSURE: 170 MMHG | RESPIRATION RATE: 16 BRPM | OXYGEN SATURATION: 98 % | DIASTOLIC BLOOD PRESSURE: 61 MMHG | HEIGHT: 59 IN

## 2017-09-07 DIAGNOSIS — M17.0 PRIMARY OSTEOARTHRITIS OF BOTH KNEES: Primary | ICD-10-CM

## 2017-09-07 RX ORDER — BETAMETHASONE SODIUM PHOSPHATE AND BETAMETHASONE ACETATE 3; 3 MG/ML; MG/ML
6 INJECTION, SUSPENSION INTRA-ARTICULAR; INTRALESIONAL; INTRAMUSCULAR; SOFT TISSUE ONCE
Qty: 1 ML | Refills: 0
Start: 2017-09-07 | End: 2017-09-07

## 2017-09-07 NOTE — PROGRESS NOTES
Patient: Ana Ballesteros                MRN: [id number]       SSN: [id number]  YOB: 1929        AGE: 80 y.o. SEX: female  Body mass index is 26.5 kg/(m^2). PCP: Jony Cade MD  09/07/17    HISTORY: Ms. Yanely Pascual is here today for bilateral knee pain. She was a previous patient of Dr. Merlin Rashid. Over the last six months, the knee pain has worsened. She has pain at night and pain with activities of daily living, kneeling, stairs, and getting up from a chair. She has no true locking or giving way. She celebrated her 87th birthday this year. She is going to be 88. PHYSICAL EXAMINATION:  On examination today, she appears much younger than 80. She moves the head and neck adequately. There is no respiratory compromise or indrawing. There is no scleral icterus. There is no JVD. EOM is normal.  Abdominal examination is nontender. The hips rotate nicely. Each knee has some patellofemoral crepitus with terminal extension. The calf is nontender. Eddi's sign is negative. There is a mild effusion in each knee and almost full range of motion. RADIOGRAPHS:  Review of her x-rays, AP, tunnel, lateral, and skyline, confirms fairly severe patellofemoral arthritis, bilaterally, and in the weightbearing space proper medially, a little worse on the left than on the right. IMPRESSION:  My overall impression is advanced to severe arthritis. Both knees are a little worse on the left than on the right. PROCEDURE:  Under aseptic conditions and after informed, written consent with a time outs, each knee was injected with 1 cc of the Celestone preparation, i.e. 6 mg, which was well tolerated. PLAN:  She is going to return to see us in about three or four weeks time for followup assessment. She may be a good candidate for viscosupplementation. I would recommend nonoperative measures for her.            REVIEW OF SYSTEMS:      CON: negative for weight loss, fever  EYE: negative for double vision  ENT: negative for hoarseness  RS:   negative for Tb  GI:    negative for blood in stool  :  negative for blood in urine  Other systems reviewed and noted below. Past Medical History:   Diagnosis Date    Hypertension        Family History   Problem Relation Age of Onset    No Known Problems Mother     No Known Problems Father        Current Outpatient Prescriptions   Medication Sig Dispense Refill    VIT C/E/ZN/COPPR/LUTEIN/ZEAXAN (PRESERVISION AREDS 2 PO) Take  by mouth.  valsartan (DIOVAN) 160 mg tablet       ketorolac (ACULAR LS) 0.4 % drop Administer 1 Drop to both eyes four (4) times daily.  latanoprost (XALATAN) 0.005 % ophthalmic solution Administer 1 Drop to both eyes nightly.  pantoprazole (PROTONIX) 40 mg tablet Take 40 mg by mouth daily.  diltiazem hcl (CARDIZEM) 120 mg tablet Take 120 mg by mouth four (4) times daily.  PNV with Ca No.65-Iron Poly-FA 60 mg iron-1 mg cap Take  by mouth.  levothyroxine (SYNTHROID) 50 mcg tablet Take  by mouth Daily (before breakfast).  cholecalciferol (VITAMIN D3) 1,000 unit tablet Take  by mouth daily.  diazePAM (VALIUM) 10 mg tablet Take 1 tab by mouth as directed by nurse prior to procedure (Patient not taking: Reported on 9/7/2017) 1 Tab 0    gabapentin (NEURONTIN) 300 mg capsule TAKE 1 CAPSULE BY MOUTH THREE TIMES DAILY (Patient not taking: Reported on 9/7/2017) 270 Cap 1    ferrous sulfate 325 mg (65 mg iron) tablet TK 1 T PO TID  1    gabapentin (NEURONTIN) 100 mg capsule Take 1 Cap by mouth three (3) times daily. (Patient not taking: Reported on 9/7/2017) 270 Cap 0    DULoxetine (CYMBALTA) 20 mg capsule TAKE 1 CAPSULE BY MOUTH DAILY (Patient not taking: Reported on 9/7/2017) 90 Cap 1    traMADol (ULTRAM) 50 mg tablet Take 1 Tab by mouth every six (6) hours as needed for Pain. Max Daily Amount: 200 mg.  (Patient not taking: Reported on 9/7/2017) 50 Tab 2    terbinafine HCl (LAMISIL) 1 % topical cream   6    losartan-hydrochlorothiazide (HYZAAR) 100-12.5 mg per tablet TK 1 T PO QD  1       Allergies   Allergen Reactions    Codeine Itching    Penicillins Unknown (comments)       Past Surgical History:   Procedure Laterality Date    HX  SECTION   +     HX HYSTERECTOMY      HX MENISCUS REPAIR Left     HX PACEMAKER PLACEMENT         Social History     Social History    Marital status:      Spouse name: N/A    Number of children: N/A    Years of education: N/A     Occupational History    Not on file. Social History Main Topics    Smoking status: Current Every Day Smoker     Packs/day: 0.50    Smokeless tobacco: Never Used    Alcohol use No    Drug use: No    Sexual activity: Not on file     Other Topics Concern    Not on file     Social History Narrative       Visit Vitals    /61 (BP 1 Location: Left arm, BP Patient Position: Sitting)    Pulse 74    Temp 96.9 °F (36.1 °C) (Oral)    Resp 16    Ht 4' 10.5\" (1.486 m)    Wt 129 lb (58.5 kg)    SpO2 98%    BMI 26.5 kg/m2         PHYSICAL EXAMINATION:  GENERAL: Alert and oriented x3, in no acute distress, well-developed, well-nourished, afebrile. HEART: No JVD. EYES: No scleral icterus   NECK: No significant lymphadenopathy   LUNGS: No respiratory compromise or indrawing  ABDOMEN: Soft, non-tender, non-distended. Electronically signed by:  Rhonda Cotter MD

## 2017-10-05 ENCOUNTER — TELEPHONE (OUTPATIENT)
Dept: ORTHOPEDIC SURGERY | Age: 82
End: 2017-10-05

## 2017-10-05 NOTE — TELEPHONE ENCOUNTER
Patient is calling requesting a xray cd of all images taken here and all her medical records for pickup on Mon. She said Dr. Anel Hicks has only seen her once.   Patient can be reached at [phone]

## 2017-10-06 ENCOUNTER — TELEPHONE (OUTPATIENT)
Dept: ORTHOPEDIC SURGERY | Facility: CLINIC | Age: 82
End: 2017-10-06

## 2018-05-17 ENCOUNTER — OFFICE VISIT (OUTPATIENT)
Dept: ORTHOPEDIC SURGERY | Age: 83
End: 2018-05-17

## 2018-05-17 VITALS
RESPIRATION RATE: 16 BRPM | SYSTOLIC BLOOD PRESSURE: 183 MMHG | BODY MASS INDEX: 25.4 KG/M2 | HEIGHT: 58 IN | DIASTOLIC BLOOD PRESSURE: 63 MMHG | TEMPERATURE: 96.9 F | OXYGEN SATURATION: 98 % | HEART RATE: 79 BPM | WEIGHT: 121 LBS

## 2018-05-17 DIAGNOSIS — M17.0 PRIMARY OSTEOARTHRITIS OF KNEES, BILATERAL: Primary | ICD-10-CM

## 2018-05-17 RX ORDER — BETAMETHASONE SODIUM PHOSPHATE AND BETAMETHASONE ACETATE 3; 3 MG/ML; MG/ML
6 INJECTION, SUSPENSION INTRA-ARTICULAR; INTRALESIONAL; INTRAMUSCULAR; SOFT TISSUE ONCE
Qty: 1 ML | Refills: 0
Start: 2018-05-17 | End: 2018-05-17

## 2018-05-17 NOTE — PROGRESS NOTES
Patient: Emory Daugherty                MRN: [id number]       SSN: [id number]  YOB: 1929        AGE: 80 y.o. SEX: female  Body mass index is 25.29 kg/(m^2). PCP: Talbot Landau, MD  05/17/18    HISTORY: I had the pleasure of reviewing Capo. Capo is a tough lady who has quite severe arthritis really involving both knees. She is a little more symptomatic on the right than on the left. She has some pain at night and pain with activities of daily living. She is a very sharp-minded, 80year-old. The injections relieved about 50-60% of her pain, and it never completely went away, and lasted for just a little over two months or so. She is requesting bilateral knee injections today. She denies any shortness of breath or recent injections, and she has otherwise been feeling well. PHYSICAL EXAMINATION:  On examination today, she is remarkably sharp-minded and appears younger than her stated age. She moves the head and neck adequately. There is no respiratory compromise or indrawing. There is no scleral icterus. There is no JVD. The hips rotate adequately. Each knee has a couple degrees fixed flexion deformity but bend well. There is mild evidence of neuropathy distally but just mild, and there are only mild varicosities distally. The calf is nontender. Eddi's sign is negative. There is mild effusion in both knees. There is certainly no evidence for infection. RADIOGRAPHS:  X-rays reviewed, AP, tunnel, lateral, and skyline, confirm quite severe arthritis involving both knees, especially both patellae, and she is quite symptomatic getting up and down from a chair. PROCEDURE:  Under aseptic conditions and after informed, written consent with a time out, each of the two knees was injected with 1 cc of the Celestone preparation, i.e. 6 mg, which was well tolerated. PLAN:  All treatment options were given, and she is requesting bilateral knee injections. Viscosupplementation may be quite helpful for her, especially for the patellofemoral aspect of her arthritis. She is going to return to see us in about six weeks. We will  the efficacy of the injections, and if she would like, we could certainly consider viscosupplementation, especially if she is getting incomplete pain relief. I would recommend nonoperative measures for her. REVIEW OF SYSTEMS:      CON: negative for weight loss, fever  EYE: negative for double vision  ENT: negative for hoarseness  RS:   negative for Tb  GI:    negative for blood in stool  :  negative for blood in urine  Other systems reviewed and noted below. Past Medical History:   Diagnosis Date    Hypertension        Family History   Problem Relation Age of Onset    No Known Problems Mother     No Known Problems Father        Current Outpatient Prescriptions   Medication Sig Dispense Refill    VIT C/E/ZN/COPPR/LUTEIN/ZEAXAN (PRESERVISION AREDS 2 PO) Take  by mouth.  ketorolac (ACULAR LS) 0.4 % drop Administer 1 Drop to both eyes four (4) times daily.  latanoprost (XALATAN) 0.005 % ophthalmic solution Administer 1 Drop to both eyes nightly.  levothyroxine (SYNTHROID) 50 mcg tablet Take  by mouth Daily (before breakfast).  cholecalciferol (VITAMIN D3) 1,000 unit tablet Take  by mouth daily.  diazePAM (VALIUM) 10 mg tablet Take 1 tab by mouth as directed by nurse prior to procedure (Patient not taking: Reported on 9/7/2017) 1 Tab 0    valsartan (DIOVAN) 160 mg tablet       gabapentin (NEURONTIN) 300 mg capsule TAKE 1 CAPSULE BY MOUTH THREE TIMES DAILY (Patient not taking: Reported on 9/7/2017) 270 Cap 1    ferrous sulfate 325 mg (65 mg iron) tablet TK 1 T PO TID  1    gabapentin (NEURONTIN) 100 mg capsule Take 1 Cap by mouth three (3) times daily.  (Patient not taking: Reported on 9/7/2017) 270 Cap 0    DULoxetine (CYMBALTA) 20 mg capsule TAKE 1 CAPSULE BY MOUTH DAILY (Patient not taking: Reported on 2017) 90 Cap 1    traMADol (ULTRAM) 50 mg tablet Take 1 Tab by mouth every six (6) hours as needed for Pain. Max Daily Amount: 200 mg. (Patient not taking: Reported on 2017) 50 Tab 2    terbinafine HCl (LAMISIL) 1 % topical cream   6    losartan-hydrochlorothiazide (HYZAAR) 100-12.5 mg per tablet TK 1 T PO QD  1    pantoprazole (PROTONIX) 40 mg tablet Take 40 mg by mouth daily.  diltiazem hcl (CARDIZEM) 120 mg tablet Take 120 mg by mouth four (4) times daily.  PNV with Ca No.65-Iron Poly-FA 60 mg iron-1 mg cap Take  by mouth. Allergies   Allergen Reactions    Codeine Itching    Penicillins Unknown (comments)       Past Surgical History:   Procedure Laterality Date    HX  SECTION   +     HX HYSTERECTOMY      HX MENISCUS REPAIR Left     HX PACEMAKER PLACEMENT         Social History     Social History    Marital status:      Spouse name: N/A    Number of children: N/A    Years of education: N/A     Occupational History    Not on file. Social History Main Topics    Smoking status: Current Every Day Smoker     Packs/day: 0.50    Smokeless tobacco: Never Used    Alcohol use No    Drug use: No    Sexual activity: Not on file     Other Topics Concern    Not on file     Social History Narrative       Visit Vitals    /63    Pulse 79    Temp 96.9 °F (36.1 °C) (Oral)    Resp 16    Ht 4' 10\" (1.473 m)    Wt 121 lb (54.9 kg)    SpO2 98%    BMI 25.29 kg/m2         PHYSICAL EXAMINATION:  GENERAL: Alert and oriented x3, in no acute distress, well-developed, well-nourished, afebrile. HEART: No JVD. EYES: No scleral icterus   NECK: No significant lymphadenopathy   LUNGS: No respiratory compromise or indrawing  ABDOMEN: Soft, non-tender, non-distended. Electronically signed by:  Emilia Vasquez MD

## 2018-12-07 RX ORDER — DILTIAZEM HYDROCHLORIDE 180 MG/1
CAPSULE, EXTENDED RELEASE ORAL DAILY
COMMUNITY

## 2018-12-16 ENCOUNTER — ANESTHESIA EVENT (OUTPATIENT)
Dept: ENDOSCOPY | Age: 83
End: 2018-12-16
Payer: MEDICARE

## 2018-12-17 ENCOUNTER — ANESTHESIA (OUTPATIENT)
Dept: ENDOSCOPY | Age: 83
End: 2018-12-17
Payer: MEDICARE

## 2018-12-17 ENCOUNTER — HOSPITAL ENCOUNTER (OUTPATIENT)
Age: 83
Setting detail: OUTPATIENT SURGERY
Discharge: HOME | End: 2018-12-17
Attending: INTERNAL MEDICINE | Admitting: INTERNAL MEDICINE
Payer: MEDICARE

## 2018-12-17 VITALS
OXYGEN SATURATION: 97 % | BODY MASS INDEX: 21.87 KG/M2 | SYSTOLIC BLOOD PRESSURE: 137 MMHG | RESPIRATION RATE: 20 BRPM | WEIGHT: 111.4 LBS | HEART RATE: 70 BPM | TEMPERATURE: 98.2 F | HEIGHT: 60 IN | DIASTOLIC BLOOD PRESSURE: 56 MMHG

## 2018-12-17 PROCEDURE — 76060000031 HC ANESTHESIA FIRST 0.5 HR: Performed by: INTERNAL MEDICINE

## 2018-12-17 PROCEDURE — 77030018846 HC SOL IRR STRL H20 ICUM -A: Performed by: INTERNAL MEDICINE

## 2018-12-17 PROCEDURE — 77030008565 HC TBNG SUC IRR ERBE -B: Performed by: INTERNAL MEDICINE

## 2018-12-17 PROCEDURE — 74011250636 HC RX REV CODE- 250/636

## 2018-12-17 PROCEDURE — 77030019988 HC FCPS ENDOSC DISP BSC -B: Performed by: INTERNAL MEDICINE

## 2018-12-17 PROCEDURE — 76040000019: Performed by: INTERNAL MEDICINE

## 2018-12-17 PROCEDURE — 74011250636 HC RX REV CODE- 250/636: Performed by: NURSE ANESTHETIST, CERTIFIED REGISTERED

## 2018-12-17 PROCEDURE — 74011000250 HC RX REV CODE- 250: Performed by: NURSE ANESTHETIST, CERTIFIED REGISTERED

## 2018-12-17 RX ORDER — SODIUM CHLORIDE 0.9 % (FLUSH) 0.9 %
5-10 SYRINGE (ML) INJECTION AS NEEDED
Status: DISCONTINUED | OUTPATIENT
Start: 2018-12-17 | End: 2018-12-24 | Stop reason: HOSPADM

## 2018-12-17 RX ORDER — PROPOFOL 10 MG/ML
INJECTION, EMULSION INTRAVENOUS AS NEEDED
Status: DISCONTINUED | OUTPATIENT
Start: 2018-12-17 | End: 2018-12-17 | Stop reason: HOSPADM

## 2018-12-17 RX ORDER — SODIUM CHLORIDE 0.9 % (FLUSH) 0.9 %
5-10 SYRINGE (ML) INJECTION EVERY 8 HOURS
Status: DISCONTINUED | OUTPATIENT
Start: 2018-12-17 | End: 2018-12-24 | Stop reason: HOSPADM

## 2018-12-17 RX ORDER — SODIUM CHLORIDE, SODIUM LACTATE, POTASSIUM CHLORIDE, CALCIUM CHLORIDE 600; 310; 30; 20 MG/100ML; MG/100ML; MG/100ML; MG/100ML
75 INJECTION, SOLUTION INTRAVENOUS CONTINUOUS
Status: DISPENSED | OUTPATIENT
Start: 2018-12-17 | End: 2018-12-18

## 2018-12-17 RX ORDER — SODIUM CHLORIDE, SODIUM LACTATE, POTASSIUM CHLORIDE, CALCIUM CHLORIDE 600; 310; 30; 20 MG/100ML; MG/100ML; MG/100ML; MG/100ML
50 INJECTION, SOLUTION INTRAVENOUS CONTINUOUS
Status: DISCONTINUED | OUTPATIENT
Start: 2018-12-17 | End: 2018-12-24 | Stop reason: HOSPADM

## 2018-12-17 RX ADMIN — PROPOFOL 20 MG: 10 INJECTION, EMULSION INTRAVENOUS at 11:06

## 2018-12-17 RX ADMIN — PROPOFOL 40 MG: 10 INJECTION, EMULSION INTRAVENOUS at 11:04

## 2018-12-17 RX ADMIN — FAMOTIDINE 20 MG: 10 INJECTION INTRAVENOUS at 10:49

## 2018-12-17 RX ADMIN — SODIUM CHLORIDE, SODIUM LACTATE, POTASSIUM CHLORIDE, AND CALCIUM CHLORIDE 75 ML/HR: 600; 310; 30; 20 INJECTION, SOLUTION INTRAVENOUS at 10:49

## 2018-12-17 RX ADMIN — SODIUM CHLORIDE, SODIUM LACTATE, POTASSIUM CHLORIDE, AND CALCIUM CHLORIDE: 600; 310; 30; 20 INJECTION, SOLUTION INTRAVENOUS at 10:58

## 2018-12-17 NOTE — ANESTHESIA POSTPROCEDURE EVALUATION
Procedure(s):  ENDOSCOPY with dilation. Anesthesia Post Evaluation      Multimodal analgesia: multimodal analgesia used between 6 hours prior to anesthesia start to PACU discharge  Patient location during evaluation: bedside  Patient participation: complete - patient participated  Level of consciousness: awake  Pain management: adequate  Airway patency: patent  Anesthetic complications: no  Cardiovascular status: stable  Respiratory status: acceptable  Hydration status: acceptable  Post anesthesia nausea and vomiting:  controlled      Visit Vitals  /56 (BP Patient Position: At rest)   Pulse 70   Temp 36.8 °C (98.2 °F)   Resp 20   Ht 5' (1.524 m)   Wt 50.5 kg (111 lb 6.4 oz)   SpO2 97%   Breastfeeding?  No   BMI 21.76 kg/m²

## 2018-12-17 NOTE — ANESTHESIA PREPROCEDURE EVALUATION
Anesthetic History   No history of anesthetic complications            Review of Systems / Medical History  Patient summary reviewed and pertinent labs reviewed    Pulmonary  Within defined limits                 Neuro/Psych       CVA  TIA     Cardiovascular            Dysrhythmias       Exercise tolerance: <4 METS     GI/Hepatic/Renal  Within defined limits              Endo/Other        Arthritis     Other Findings              Physical Exam    Airway  Mallampati: III  TM Distance: 4 - 6 cm  Neck ROM: decreased range of motion   Mouth opening: Diminished (comment)     Cardiovascular    Rhythm: regular  Rate: normal         Dental    Dentition: Full lower dentures and Full upper dentures     Pulmonary  Breath sounds clear to auscultation               Abdominal  GI exam deferred       Other Findings            Anesthetic Plan    ASA: 3  Anesthesia type: MAC            Anesthetic plan and risks discussed with: Patient

## 2018-12-17 NOTE — H&P
History and Physical reviewed; I have examined the patient and there are no pertinent changes. Elly Lefort, MD, MD   10:49 AM 12/17/2018  [facility], [address] [place] [address]  [email or web address]

## 2018-12-17 NOTE — PERIOP NOTES
Patient armband removed and shredded  Patient confirmed by two identifiers with discharge instructions prior too being provided to patient and daughter.

## 2018-12-17 NOTE — DISCHARGE INSTRUCTIONS
Esophageal Dilation: What to Expect at [address] [place] [address]  After you have esophageal dilation, you will stay at the hospital or surgery center for 1 to 2 hours. This will allow the medicine to wear off. You will be able to go home after your doctor or nurse checks to make sure you are not having any problems. This care sheet gives you a general idea about how long it will take for you to recover. But each person recovers at a different pace. Follow the steps below to get better as quickly as possible. How can you care for yourself at home? Activity    · Rest as much as you need to after you go home.     · You should be able to go back to your usual activities the day after the procedure. Diet    · Follow your doctor's directions for eating after the procedure.     · Drink plenty of fluids (unless your doctor has told you not to). Medicines    · Your doctor will tell you if and when you can restart your medicines. He or she will also give you instructions about taking any new medicines.     · If you take blood thinners, such as warfarin (Coumadin), clopidogrel (Plavix), or aspirin, be sure to talk to your doctor. He or she will tell you if and when to start taking those medicines again. Make sure that you understand exactly what your doctor wants you to do.     · If you have a sore throat the day after the procedure, use an over-the-counter spray to numb your throat. Sucking on throat lozenges and gargling with warm salt water may also help relieve your symptoms. Follow-up care is a key part of your treatment and safety. Be sure to make and go to all appointments, and call your doctor if you are having problems. It's also a good idea to know your test results and keep a list of the medicines you take. When should you call for help? Call 911 anytime you think you may need emergency care.  For example, call if:    · You passed out (lost consciousness).     · You have trouble breathing.     · Your stools are maroon or very bloody    Call your doctor now or seek immediate medical care if:    · You have new or worse belly pain.     · You have a fever.     · You have new or more blood in your stools.     · You are sick to your stomach and cannot drink fluids.     · You cannot pass stools or gas.     · You have pain that does not get better after you take pain medicine.    Watch closely for changes in your health, and be sure to contact your doctor if:    · Your throat still hurts after a day or two.     · You do not get better as expected. Where can you learn more? Go to http://gaby-jovana.info/. Enter A508 in the search box to learn more about \"Esophageal Dilation: What to Expect at Home. \"  Current as of: March 28, 2018  Content Version: 11.8  © 3750-9313 [employer]. Care instructions adapted under license by [employer] (which disclaims liability or warranty for this information). If you have questions about a medical condition or this instruction, always ask your healthcare professional. [place] [address] any warranty or liability for your use of this information. Esophagitis: Care Instructions  Your Care Instructions    Esophagitis (say \"ih-sof-uh-JY-tus\") is irritation of the esophagus, the tube that carries food from your throat to your stomach. Acid reflux is the most common cause of this condition. When you have reflux, stomach acid and juices flow upward. This can cause pain or a burning feeling in your chest. You may have a sore throat. It may be hard to swallow. Other causes of this condition include some medicines and supplements. Allergies or an infection can also cause it. Your doctor will ask about your symptoms and past health. He or she might do tests to find the cause of your symptoms. Treatment depends on what is causing the problem. Treatment might include changing your diet or taking medicine to relieve your symptoms.  It might also include changing a medicine that is causing your symptoms. If you have reflux, medicine that reduces the stomach acid helps your body heal. It might take 1 to 3 weeks to heal.  Follow-up care is a key part of your treatment and safety. Be sure to make and go to all appointments, and call your doctor if you are having problems. It's also a good idea to know your test results and keep a list of the medicines you take. How can you care for yourself at home? · If you have acid reflux, your doctor may recommend that you:  ? Eat several small meals instead of two or three large meals. After you eat, wait 2 to 3 hours before you lie down. ? Avoid chocolate, mint, alcohol, and spicy foods. ? Don't smoke or use smokeless tobacco. Smoking can make this condition worse. If you need help quitting, talk to your doctor about stop-smoking programs and medicines. These can increase your chances of quitting for good. ? Raise the head of your bed 6 to 8 inches if you have symptoms at night. ? Lose weight if you are overweight. ? Take an over-the-counter antacid, such as Maalox, Mylanta, or Tums. Be careful when you take over-the-counter antacid medicines. Many of these medicines have aspirin in them. Read the label to make sure that you are not taking more than the recommended dose. Too much aspirin can be harmful. ? Take stronger acid reducers. Examples are famotidine (such as Pepcid), omeprazole (such as Prilosec), and ranitidine (such as Zantac). · If your condition is caused by infection, allergy, or other problems, use the medicine or treatments that your doctor recommends. · Be safe with medicines. Take your medicines exactly as prescribed. Call your doctor if you think you are having a problem with your medicine. When should you call for help?   Call your doctor now or seek immediate medical care if:    · You have new or worse belly pain.     · You are vomiting.    Watch closely for changes in your health, and be sure to contact your doctor if:    · You have new or worse symptoms of reflux.     · You have trouble or pain swallowing.     · You are losing weight.     · You do not get better as expected. Where can you learn more? Go to http://gaby-jovana.info/. Enter B346 in the search box to learn more about \"Esophagitis: Care Instructions. \"  Current as of: March 28, 2018  Content Version: 11.8  © 1380-6011 [employer]. Care instructions adapted under license by [employer] (which disclaims liability or warranty for this information). If you have questions about a medical condition or this instruction, always ask your healthcare professional. [place] [address] any warranty or liability for your use of this information. Hiatal Hernia: Care Instructions  Your Care Instructions  A hiatal hernia occurs when part of the stomach bulges into the chest cavity. A hiatal hernia may allow stomach acid and juices to back up into the esophagus (acid reflux). This can cause a feeling of burning, warmth, heat, or pain behind the breastbone. This feeling may often occur after you eat, soon after you lie down, or when you bend forward, and it may come and go. You also may have a sour taste in your mouth. These symptoms are commonly known as heartburn or reflux. But not all hiatal hernias cause symptoms. Follow-up care is a key part of your treatment and safety. Be sure to make and go to all appointments, and call your doctor if you are having problems. It's also a good idea to know your test results and keep a list of the medicines you take. How can you care for yourself at home? · Take your medicines exactly as prescribed. Call your doctor if you think you are having a problem with your medicine. · Do not take aspirin or other nonsteroidal anti-inflammatory drugs (NSAIDs), such as ibuprofen (Advil, Motrin) or naproxen (Aleve), unless your doctor says it is okay.  Ask your doctor what you can take for pain. · Your doctor may recommend over-the-counter medicine. For mild or occasional indigestion, antacids such as Tums, Gaviscon, Maalox, or Mylanta may help. Your doctor also may recommend over-the-counter acid reducers, such as famotidine (Pepcid AC), cimetidine (Tagamet HB), ranitidine (Zantac 75 and Zantac 150), or omeprazole (Prilosec). Read and follow all instructions on the label. If you use these medicines often, talk with your doctor. · Change your eating habits. ? It's best to eat several small meals instead of two or three large meals. ? After you eat, wait 2 to 3 hours before you lie down. Late-night snacks aren't a good idea. ? Chocolate, mint, and alcohol can make heartburn worse. They relax the valve between the esophagus and the stomach. ? Spicy foods, foods that have a lot of acid (like tomatoes and oranges), and coffee can make heartburn symptoms worse in some people. If your symptoms are worse after you eat a certain food, you may want to stop eating that food to see if your symptoms get better. · Do not smoke or chew tobacco.  · If you get heartburn at night, raise the head of your bed 6 to 8 inches by putting the frame on blocks or placing a foam wedge under the head of your mattress. (Adding extra pillows does not work.)  · Do not wear tight clothing around your middle. · Lose weight if you need to. Losing just 5 to 10 pounds can help. When should you call for help? Call your doctor now or seek immediate medical care if:    · You have new or worse belly pain.     · You are vomiting.    Watch closely for changes in your health, and be sure to contact your doctor if:    · You have new or worse symptoms of indigestion.     · You have trouble or pain swallowing.     · You are losing weight.     · You do not get better as expected. Where can you learn more? Go to http://holden.info/.   Enter U276 in the search box to learn more about \"Hiatal Hernia: Care Instructions. \"  Current as of: March 28, 2018  Content Version: 11.8  © 5159-6638 [employer]. Care instructions adapted under license by [employer] (which disclaims liability or warranty for this information). If you have questions about a medical condition or this instruction, always ask your healthcare professional. [address] any warranty or liability for your use of this information. DISCHARGE SUMMARY from Nurse    PATIENT INSTRUCTIONS:    After general anesthesia or intravenous sedation, for 24 hours or while taking prescription Narcotics:  · Limit your activities  · Do not drive and operate hazardous machinery  · Do not make important personal or business decisions  · Do  not drink alcoholic beverages  · If you have not urinated within 8 hours after discharge, please contact your surgeon on call. Report the following to your surgeon:  · Excessive pain, swelling, redness or odor of or around the surgical area  · Temperature over 100.5  · Nausea and vomiting lasting longer than 4 hours or if unable to take medications  · Any signs of decreased circulation or nerve impairment to extremity: change in color, persistent  numbness, tingling, coldness or increase pain  · Any questions    *  Please give a list of your current medications to your Primary Care Provider. *  Please update this list whenever your medications are discontinued, doses are      changed, or new medications (including over-the-counter products) are added. *  Please carry medication information at all times in case of emergency situations. These are general instructions for a healthy lifestyle:    No smoking/ No tobacco products/ Avoid exposure to second hand smoke  Surgeon General's Warning:  Quitting smoking now greatly reduces serious risk to your health.     Obesity, smoking, and sedentary lifestyle greatly increases your risk for illness    A healthy diet, regular physical exercise & weight monitoring are important for maintaining a healthy lifestyle    You may be retaining fluid if you have a history of heart failure or if you experience any of the following symptoms:  Weight gain of 3 pounds or more overnight or 5 pounds in a week, increased swelling in our hands or feet or shortness of breath while lying flat in bed. Please call your doctor as soon as you notice any of these symptoms; do not wait until your next office visit. Recognize signs and symptoms of STROKE:    F-face looks uneven    A-arms unable to move or move unevenly    S-speech slurred or non-existent    T-time-call 911 as soon as signs and symptoms begin-DO NOT go       Back to bed or wait to see if you get better-TIME IS BRAIN. Warning Signs of HEART ATTACK     Call 911 if you have these symptoms:   Chest discomfort. Most heart attacks involve discomfort in the center of the chest that lasts more than a few minutes, or that goes away and comes back. It can feel like uncomfortable pressure, squeezing, fullness, or pain.  Discomfort in other areas of the upper body. Symptoms can include pain or discomfort in one or both arms, the back, neck, jaw, or stomach.  Shortness of breath with or without chest discomfort.  Other signs may include breaking out in a cold sweat, nausea, or lightheadedness. Don't wait more than five minutes to call 911 - MINUTES MATTER! Fast action can save your life. Calling 911 is almost always the fastest way to get lifesaving treatment. Emergency Medical Services staff can begin treatment when they arrive -- up to an hour sooner than if someone gets to the hospital by car. The discharge information has been reviewed with the patient/family. The patient/family verbalized understanding.   Discharge medications reviewed with the patient/family and appropriate educational materials and side effects teaching were provided.   ___________________________________________________________________________________________________________________________________

## 2018-12-17 NOTE — PROCEDURES
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 Alisha Mejia MD    Assistant(s): [unfilled]    Type of Anesthesia:MAC     EBL:None    Specimens: * No specimens in log *    Findings: See printed and scanned procedure note    Complications: None    Dr. Alisha Mejia MD  12/17/2018  11:19 AM

## 2023-07-26 NOTE — TELEPHONE ENCOUNTER
Patient called the [facility] trying to return Mamta's phone call. PAC transferred the call to my desk since Jaci Tejeda is in our office this morning. Informed patient (per David Florez) that Jaci Tejeda would give her a call back this afternoon when they are not helping patients. Patient understood. ED Dr. Hernández Dr Hernández

## (undated) DEVICE — SYRINGE MED 25GA 3ML L5/8IN SUBQ PLAS W/ DETACH NDL SFTY

## (undated) DEVICE — MEDI-VAC NON-CONDUCTIVE SUCTION TUBING: Brand: CARDINAL HEALTH

## (undated) DEVICE — GAUZE SPONGES,16 PLY: Brand: CURITY

## (undated) DEVICE — AIRLIFE™ NASAL OXYGEN CANNULA CURVED, FLARED TIP WITH 14 FOOT (4.3 M) CRUSH-RESISTANT TUBING, OVER-THE-EAR STYLE: Brand: AIRLIFE™

## (undated) DEVICE — MEDI-VAC SUCTION HIGH CAPACITY: Brand: CARDINAL HEALTH

## (undated) DEVICE — FLUFF AND POLYMER UNDERPAD,EXTRA HEAVY: Brand: WINGS

## (undated) DEVICE — SOLUTION IRRIG 1000ML H2O STRL BLT

## (undated) DEVICE — BITE BLOCK ENDOSCP UNIV AD 6 TO 9.4 MM

## (undated) DEVICE — SYR 50ML SLIP TIP NSAF LF STRL --

## (undated) DEVICE — ENDOSCOPY PUMP TUBING/ CAP SET: Brand: ERBE

## (undated) DEVICE — CATHETER SUCT TR FL TIP 14FR W/ O CTRL

## (undated) DEVICE — (D)GLOVE EXAM LG NITRL NS -- DISC BY MFR NO SUB

## (undated) DEVICE — BASIN EMESIS 500CC ROSE 250/CS 60/PLT: Brand: MEDEGEN MEDICAL PRODUCTS, LLC

## (undated) DEVICE — FCPS RAD JAW 4LC 240CM W/NDL -- BX/20 RADIAL JAW 4

## (undated) DEVICE — FLEX ADVANTAGE 3000CC: Brand: FLEX ADVANTAGE

## (undated) DEVICE — STERILE POLYISOPRENE POWDER-FREE SURGICAL GLOVES: Brand: PROTEXIS